# Patient Record
Sex: MALE | Race: WHITE | NOT HISPANIC OR LATINO | Employment: FULL TIME | URBAN - METROPOLITAN AREA
[De-identification: names, ages, dates, MRNs, and addresses within clinical notes are randomized per-mention and may not be internally consistent; named-entity substitution may affect disease eponyms.]

---

## 2017-10-27 ENCOUNTER — AMBULATORY - RIVER FALLS (OUTPATIENT)
Dept: FAMILY MEDICINE | Facility: CLINIC | Age: 37
End: 2017-10-27
Payer: COMMERCIAL

## 2018-01-09 ENCOUNTER — OFFICE VISIT - RIVER FALLS (OUTPATIENT)
Dept: FAMILY MEDICINE | Facility: CLINIC | Age: 38
End: 2018-01-09
Payer: COMMERCIAL

## 2018-01-09 ASSESSMENT — MIFFLIN-ST. JEOR: SCORE: 1960.31

## 2018-02-23 ENCOUNTER — OFFICE VISIT - RIVER FALLS (OUTPATIENT)
Dept: FAMILY MEDICINE | Facility: CLINIC | Age: 38
End: 2018-02-23
Payer: COMMERCIAL

## 2018-03-12 ENCOUNTER — AMBULATORY - RIVER FALLS (OUTPATIENT)
Dept: FAMILY MEDICINE | Facility: CLINIC | Age: 38
End: 2018-03-12
Payer: COMMERCIAL

## 2018-03-13 ENCOUNTER — OFFICE VISIT - RIVER FALLS (OUTPATIENT)
Dept: FAMILY MEDICINE | Facility: CLINIC | Age: 38
End: 2018-03-13
Payer: COMMERCIAL

## 2018-03-13 ASSESSMENT — MIFFLIN-ST. JEOR: SCORE: 1975.73

## 2018-03-29 ENCOUNTER — AMBULATORY - RIVER FALLS (OUTPATIENT)
Dept: FAMILY MEDICINE | Facility: CLINIC | Age: 38
End: 2018-03-29
Payer: COMMERCIAL

## 2018-04-09 ENCOUNTER — OFFICE VISIT - RIVER FALLS (OUTPATIENT)
Dept: FAMILY MEDICINE | Facility: CLINIC | Age: 38
End: 2018-04-09
Payer: COMMERCIAL

## 2018-04-09 ASSESSMENT — MIFFLIN-ST. JEOR: SCORE: 1953.05

## 2018-05-09 ENCOUNTER — AMBULATORY - RIVER FALLS (OUTPATIENT)
Dept: FAMILY MEDICINE | Facility: CLINIC | Age: 38
End: 2018-05-09
Payer: COMMERCIAL

## 2018-06-14 ENCOUNTER — OFFICE VISIT - RIVER FALLS (OUTPATIENT)
Dept: FAMILY MEDICINE | Facility: CLINIC | Age: 38
End: 2018-06-14
Payer: COMMERCIAL

## 2018-06-14 ASSESSMENT — MIFFLIN-ST. JEOR: SCORE: 1984.81

## 2018-10-02 ENCOUNTER — AMBULATORY - RIVER FALLS (OUTPATIENT)
Dept: FAMILY MEDICINE | Facility: CLINIC | Age: 38
End: 2018-10-02
Payer: COMMERCIAL

## 2018-10-10 ENCOUNTER — OFFICE VISIT - RIVER FALLS (OUTPATIENT)
Dept: FAMILY MEDICINE | Facility: CLINIC | Age: 38
End: 2018-10-10
Payer: COMMERCIAL

## 2018-10-10 ASSESSMENT — MIFFLIN-ST. JEOR: SCORE: 1979.36

## 2019-05-13 ENCOUNTER — COMMUNICATION - RIVER FALLS (OUTPATIENT)
Dept: FAMILY MEDICINE | Facility: CLINIC | Age: 39
End: 2019-05-13
Payer: COMMERCIAL

## 2019-05-13 ENCOUNTER — OFFICE VISIT - RIVER FALLS (OUTPATIENT)
Dept: FAMILY MEDICINE | Facility: CLINIC | Age: 39
End: 2019-05-13
Payer: COMMERCIAL

## 2019-05-13 ASSESSMENT — MIFFLIN-ST. JEOR: SCORE: 1984.81

## 2019-05-14 LAB
BASOPHILS # BLD MANUAL: 21 10*3/UL (ref 0–200)
BASOPHILS NFR BLD MANUAL: 0.3 %
EOSINOPHIL # BLD MANUAL: 28 10*3/UL (ref 15–500)
EOSINOPHIL NFR BLD MANUAL: 0.4 %
ERYTHROCYTE [DISTWIDTH] IN BLOOD BY AUTOMATED COUNT: 12.7 % (ref 11–15)
HCT VFR BLD AUTO: 46.9 % (ref 38.5–50)
HGB BLD-MCNC: 16.5 GM/DL (ref 13.2–17.1)
LYMPHOCYTES # BLD MANUAL: 2329 10*3/UL (ref 850–3900)
LYMPHOCYTES NFR BLD MANUAL: 32.8 %
MCH RBC QN AUTO: 30.4 PG (ref 27–33)
MCHC RBC AUTO-ENTMCNC: 35.2 GM/DL (ref 32–36)
MCV RBC AUTO: 86.5 FL (ref 80–100)
MONOCYTES # BLD MANUAL: 547 10*3/UL (ref 200–950)
MONOCYTES NFR BLD MANUAL: 7.7 %
NEUTROPHILS # BLD MANUAL: 4175 10*3/UL (ref 1500–7800)
NEUTROPHILS NFR BLD MANUAL: 58.8 %
PLATELET # BLD AUTO: 360 10*3/UL (ref 140–400)
PMV BLD: 10 FL (ref 7.5–12.5)
RBC # BLD AUTO: 5.42 10*6/UL (ref 4.2–5.8)
WBC # BLD AUTO: 7.1 10*3/UL (ref 3.8–10.8)

## 2019-11-04 ENCOUNTER — OFFICE VISIT - RIVER FALLS (OUTPATIENT)
Dept: FAMILY MEDICINE | Facility: CLINIC | Age: 39
End: 2019-11-04
Payer: COMMERCIAL

## 2019-11-04 ASSESSMENT — MIFFLIN-ST. JEOR: SCORE: 1997.51

## 2020-02-23 ENCOUNTER — OFFICE VISIT - RIVER FALLS (OUTPATIENT)
Dept: FAMILY MEDICINE | Facility: CLINIC | Age: 40
End: 2020-02-23
Payer: COMMERCIAL

## 2020-02-23 ASSESSMENT — MIFFLIN-ST. JEOR: SCORE: 1964.85

## 2020-02-27 ENCOUNTER — OFFICE VISIT - RIVER FALLS (OUTPATIENT)
Dept: FAMILY MEDICINE | Facility: CLINIC | Age: 40
End: 2020-02-27
Payer: COMMERCIAL

## 2020-02-27 ASSESSMENT — MIFFLIN-ST. JEOR: SCORE: 1965.76

## 2020-02-28 ENCOUNTER — COMMUNICATION - RIVER FALLS (OUTPATIENT)
Dept: FAMILY MEDICINE | Facility: CLINIC | Age: 40
End: 2020-02-28
Payer: COMMERCIAL

## 2020-02-28 LAB
A/G RATIO - HISTORICAL: 1.6 (ref 1–2.5)
ALBUMIN SERPL-MCNC: 4.3 GM/DL (ref 3.6–5.1)
ALP SERPL-CCNC: 76 UNIT/L (ref 36–130)
ALT SERPL W P-5'-P-CCNC: 21 UNIT/L (ref 9–46)
AST SERPL W P-5'-P-CCNC: 19 UNIT/L (ref 10–40)
BILIRUB DIRECT SERPL-MCNC: 0.1 MG/DL
BILIRUB INDIRECT SERPL-MCNC: 0.4 MG/DL (ref 0.2–1.2)
BILIRUB SERPL-MCNC: 0.5 MG/DL (ref 0.2–1.2)
BUN SERPL-MCNC: 13 MG/DL (ref 7–25)
BUN/CREAT RATIO - HISTORICAL: ABNORMAL (ref 6–22)
CALCIUM SERPL-MCNC: 9.5 MG/DL (ref 8.6–10.3)
CHLORIDE BLD-SCNC: 102 MMOL/L (ref 98–110)
CO2 SERPL-SCNC: 28 MMOL/L (ref 20–32)
CREAT SERPL-MCNC: 1 MG/DL (ref 0.6–1.35)
EGFRCR SERPLBLD CKD-EPI 2021: 95 ML/MIN/1.73M2
ERYTHROCYTE [DISTWIDTH] IN BLOOD BY AUTOMATED COUNT: 12.4 % (ref 11–15)
ERYTHROCYTE [SEDIMENTATION RATE] IN BLOOD BY WESTERGREN METHOD: 2 MM/H
GLOBULIN: 2.7 (ref 1.9–3.7)
GLUCOSE BLD-MCNC: 113 MG/DL (ref 65–99)
HCT VFR BLD AUTO: 46.1 % (ref 38.5–50)
HGB BLD-MCNC: 16.4 GM/DL (ref 13.2–17.1)
LDLC SERPL CALC-MCNC: 103 MG/DL
MCH RBC QN AUTO: 30.9 PG (ref 27–33)
MCHC RBC AUTO-ENTMCNC: 35.6 GM/DL (ref 32–36)
MCV RBC AUTO: 86.8 FL (ref 80–100)
PLATELET # BLD AUTO: 322 10*3/UL (ref 140–400)
PMV BLD: 10.5 FL (ref 7.5–12.5)
POTASSIUM BLD-SCNC: 4.2 MMOL/L (ref 3.5–5.3)
PROT SERPL-MCNC: 7 GM/DL (ref 6.1–8.1)
RBC # BLD AUTO: 5.31 10*6/UL (ref 4.2–5.8)
SODIUM SERPL-SCNC: 137 MMOL/L (ref 135–146)
TSH SERPL DL<=0.005 MIU/L-ACNC: 1.41 MIU/L (ref 0.4–4.5)
WBC # BLD AUTO: 6.6 10*3/UL (ref 3.8–10.8)

## 2020-05-27 ENCOUNTER — AMBULATORY - RIVER FALLS (OUTPATIENT)
Dept: FAMILY MEDICINE | Facility: CLINIC | Age: 40
End: 2020-05-27
Payer: COMMERCIAL

## 2020-05-28 ENCOUNTER — COMMUNICATION - RIVER FALLS (OUTPATIENT)
Dept: FAMILY MEDICINE | Facility: CLINIC | Age: 40
End: 2020-05-28
Payer: COMMERCIAL

## 2020-05-28 LAB — SARS-COV-2 AB PNL SERPL IA: NEGATIVE

## 2020-07-13 ENCOUNTER — AMBULATORY - RIVER FALLS (OUTPATIENT)
Dept: FAMILY MEDICINE | Facility: CLINIC | Age: 40
End: 2020-07-13
Payer: COMMERCIAL

## 2020-07-14 LAB
BASOPHILS # BLD MANUAL: 31 10*3/UL (ref 0–200)
BASOPHILS NFR BLD MANUAL: 0.5 %
EOSINOPHIL # BLD MANUAL: 50 10*3/UL (ref 15–500)
EOSINOPHIL NFR BLD MANUAL: 0.8 %
ERYTHROCYTE [DISTWIDTH] IN BLOOD BY AUTOMATED COUNT: 12.6 % (ref 11–15)
HCT VFR BLD AUTO: 46.4 % (ref 38.5–50)
HGB BLD-MCNC: 15.8 GM/DL (ref 13.2–17.1)
LYMPHOCYTES # BLD MANUAL: 2027 10*3/UL (ref 850–3900)
LYMPHOCYTES NFR BLD MANUAL: 32.7 %
MCH RBC QN AUTO: 29.9 PG (ref 27–33)
MCHC RBC AUTO-ENTMCNC: 34.1 GM/DL (ref 32–36)
MCV RBC AUTO: 87.9 FL (ref 80–100)
MONOCYTES # BLD MANUAL: 632 10*3/UL (ref 200–950)
MONOCYTES NFR BLD MANUAL: 10.2 %
NEUTROPHILS # BLD MANUAL: 3460 10*3/UL (ref 1500–7800)
NEUTROPHILS NFR BLD MANUAL: 55.8 %
PLATELET # BLD AUTO: 344 10*3/UL (ref 140–400)
PMV BLD: 10.5 FL (ref 7.5–12.5)
RBC # BLD AUTO: 5.28 10*6/UL (ref 4.2–5.8)
WBC # BLD AUTO: 6.2 10*3/UL (ref 3.8–10.8)

## 2021-01-19 ENCOUNTER — OFFICE VISIT - RIVER FALLS (OUTPATIENT)
Dept: FAMILY MEDICINE | Facility: CLINIC | Age: 41
End: 2021-01-19
Payer: COMMERCIAL

## 2021-01-19 ENCOUNTER — COMMUNICATION - RIVER FALLS (OUTPATIENT)
Dept: FAMILY MEDICINE | Facility: CLINIC | Age: 41
End: 2021-01-19
Payer: COMMERCIAL

## 2021-01-27 ENCOUNTER — OFFICE VISIT - RIVER FALLS (OUTPATIENT)
Dept: FAMILY MEDICINE | Facility: CLINIC | Age: 41
End: 2021-01-27
Payer: COMMERCIAL

## 2021-01-27 ASSESSMENT — MIFFLIN-ST. JEOR: SCORE: 1953.05

## 2021-04-15 ENCOUNTER — OFFICE VISIT - RIVER FALLS (OUTPATIENT)
Dept: FAMILY MEDICINE | Facility: CLINIC | Age: 41
End: 2021-04-15
Payer: COMMERCIAL

## 2021-04-15 ASSESSMENT — MIFFLIN-ST. JEOR: SCORE: 1965.76

## 2021-04-29 ENCOUNTER — COMMUNICATION - RIVER FALLS (OUTPATIENT)
Dept: FAMILY MEDICINE | Facility: CLINIC | Age: 41
End: 2021-04-29
Payer: COMMERCIAL

## 2021-05-27 ENCOUNTER — RECORDS - HEALTHEAST (OUTPATIENT)
Dept: ADMINISTRATIVE | Facility: CLINIC | Age: 41
End: 2021-05-27

## 2021-05-28 ENCOUNTER — RECORDS - HEALTHEAST (OUTPATIENT)
Dept: ADMINISTRATIVE | Facility: CLINIC | Age: 41
End: 2021-05-28

## 2021-09-14 ENCOUNTER — OFFICE VISIT - RIVER FALLS (OUTPATIENT)
Dept: FAMILY MEDICINE | Facility: CLINIC | Age: 41
End: 2021-09-14
Payer: COMMERCIAL

## 2021-09-14 ASSESSMENT — MIFFLIN-ST. JEOR: SCORE: 1970.74

## 2021-09-20 ENCOUNTER — COMMUNICATION - RIVER FALLS (OUTPATIENT)
Dept: FAMILY MEDICINE | Facility: CLINIC | Age: 41
End: 2021-09-20
Payer: COMMERCIAL

## 2021-09-24 ENCOUNTER — OFFICE VISIT - RIVER FALLS (OUTPATIENT)
Dept: FAMILY MEDICINE | Facility: CLINIC | Age: 41
End: 2021-09-24
Payer: COMMERCIAL

## 2021-09-24 ASSESSMENT — MIFFLIN-ST. JEOR: SCORE: 1970.74

## 2021-11-29 ENCOUNTER — OFFICE VISIT - RIVER FALLS (OUTPATIENT)
Dept: FAMILY MEDICINE | Facility: CLINIC | Age: 41
End: 2021-11-29
Payer: COMMERCIAL

## 2021-11-29 ASSESSMENT — MIFFLIN-ST. JEOR: SCORE: 2017.01

## 2021-12-01 ENCOUNTER — COMMUNICATION - RIVER FALLS (OUTPATIENT)
Dept: FAMILY MEDICINE | Facility: CLINIC | Age: 41
End: 2021-12-01
Payer: COMMERCIAL

## 2021-12-01 LAB
A/G RATIO - HISTORICAL: 1.5 (ref 1–2.5)
ALBUMIN SERPL-MCNC: 4.3 GM/DL (ref 3.6–5.1)
ALP SERPL-CCNC: 64 UNIT/L (ref 36–130)
ALT SERPL W P-5'-P-CCNC: 19 UNIT/L (ref 9–46)
AST SERPL W P-5'-P-CCNC: 17 UNIT/L (ref 10–40)
BILIRUB DIRECT SERPL-MCNC: 0.1 MG/DL
BILIRUB INDIRECT SERPL-MCNC: 0.4 MG/DL (ref 0.2–1.2)
BILIRUB SERPL-MCNC: 0.5 MG/DL (ref 0.2–1.2)
BUN SERPL-MCNC: 15 MG/DL (ref 7–25)
BUN/CREAT RATIO - HISTORICAL: NORMAL (ref 6–22)
CALCIUM SERPL-MCNC: 9.6 MG/DL (ref 8.6–10.3)
CHLORIDE BLD-SCNC: 102 MMOL/L (ref 98–110)
CO2 SERPL-SCNC: 25 MMOL/L (ref 20–32)
CREAT SERPL-MCNC: 1.26 MG/DL (ref 0.6–1.35)
EGFRCR SERPLBLD CKD-EPI 2021: 71 ML/MIN/1.73M2
ERYTHROCYTE [DISTWIDTH] IN BLOOD BY AUTOMATED COUNT: 12.7 % (ref 11–15)
GLOBULIN: 2.9 (ref 1.9–3.7)
GLUCOSE BLD-MCNC: 91 MG/DL (ref 65–99)
HCT VFR BLD AUTO: 44.7 % (ref 38.5–50)
HGB BLD-MCNC: 16.1 GM/DL (ref 13.2–17.1)
LDLC SERPL CALC-MCNC: 117 MG/DL
MCH RBC QN AUTO: 31 PG (ref 27–33)
MCHC RBC AUTO-ENTMCNC: 36 GM/DL (ref 32–36)
MCV RBC AUTO: 86.1 FL (ref 80–100)
PLATELET # BLD AUTO: 356 10*3/UL (ref 140–400)
PMV BLD: 10.2 FL (ref 7.5–12.5)
POTASSIUM BLD-SCNC: 4.2 MMOL/L (ref 3.5–5.3)
PROT SERPL-MCNC: 7.2 GM/DL (ref 6.1–8.1)
RBC # BLD AUTO: 5.19 10*6/UL (ref 4.2–5.8)
SODIUM SERPL-SCNC: 136 MMOL/L (ref 135–146)
WBC # BLD AUTO: 6.5 10*3/UL (ref 3.8–10.8)

## 2022-01-20 ENCOUNTER — COMMUNICATION - RIVER FALLS (OUTPATIENT)
Dept: FAMILY MEDICINE | Facility: CLINIC | Age: 42
End: 2022-01-20
Payer: COMMERCIAL

## 2022-02-11 VITALS
HEIGHT: 71 IN | WEIGHT: 226.8 LBS | HEART RATE: 85 BPM | HEART RATE: 62 BPM | BODY MASS INDEX: 31.75 KG/M2 | HEIGHT: 71 IN | BODY MASS INDEX: 31.78 KG/M2 | OXYGEN SATURATION: 97 % | TEMPERATURE: 97 F | SYSTOLIC BLOOD PRESSURE: 116 MMHG | DIASTOLIC BLOOD PRESSURE: 74 MMHG | SYSTOLIC BLOOD PRESSURE: 108 MMHG | WEIGHT: 227 LBS | DIASTOLIC BLOOD PRESSURE: 60 MMHG

## 2022-02-11 VITALS
HEART RATE: 72 BPM | HEIGHT: 71 IN | BODY MASS INDEX: 32.37 KG/M2 | WEIGHT: 231.2 LBS | TEMPERATURE: 98.6 F | DIASTOLIC BLOOD PRESSURE: 72 MMHG | SYSTOLIC BLOOD PRESSURE: 124 MMHG

## 2022-02-12 VITALS
WEIGHT: 229.2 LBS | HEART RATE: 64 BPM | DIASTOLIC BLOOD PRESSURE: 74 MMHG | BODY MASS INDEX: 31.39 KG/M2 | HEIGHT: 71 IN | BODY MASS INDEX: 32.09 KG/M2 | SYSTOLIC BLOOD PRESSURE: 122 MMHG | HEART RATE: 74 BPM | HEIGHT: 71 IN | DIASTOLIC BLOOD PRESSURE: 70 MMHG | SYSTOLIC BLOOD PRESSURE: 112 MMHG | WEIGHT: 224.2 LBS

## 2022-02-12 VITALS
HEART RATE: 109 BPM | HEIGHT: 71 IN | OXYGEN SATURATION: 95 % | WEIGHT: 230 LBS | BODY MASS INDEX: 32.2 KG/M2 | DIASTOLIC BLOOD PRESSURE: 88 MMHG | SYSTOLIC BLOOD PRESSURE: 116 MMHG

## 2022-02-12 VITALS
OXYGEN SATURATION: 97 % | OXYGEN SATURATION: 96 % | BODY MASS INDEX: 31.61 KG/M2 | WEIGHT: 223 LBS | HEART RATE: 103 BPM | TEMPERATURE: 97.9 F | SYSTOLIC BLOOD PRESSURE: 101 MMHG | HEART RATE: 98 BPM | SYSTOLIC BLOOD PRESSURE: 122 MMHG | DIASTOLIC BLOOD PRESSURE: 70 MMHG | HEIGHT: 71 IN | DIASTOLIC BLOOD PRESSURE: 80 MMHG | BODY MASS INDEX: 30.88 KG/M2 | TEMPERATURE: 97.1 F | WEIGHT: 225.8 LBS

## 2022-02-12 VITALS
SYSTOLIC BLOOD PRESSURE: 112 MMHG | OXYGEN SATURATION: 99 % | BODY MASS INDEX: 31.93 KG/M2 | HEART RATE: 79 BPM | HEIGHT: 71 IN | BODY MASS INDEX: 31.93 KG/M2 | WEIGHT: 228.1 LBS | WEIGHT: 228.1 LBS | DIASTOLIC BLOOD PRESSURE: 75 MMHG | HEART RATE: 82 BPM | SYSTOLIC BLOOD PRESSURE: 120 MMHG | HEIGHT: 71 IN | DIASTOLIC BLOOD PRESSURE: 72 MMHG

## 2022-02-12 VITALS
HEART RATE: 86 BPM | HEIGHT: 71 IN | DIASTOLIC BLOOD PRESSURE: 75 MMHG | OXYGEN SATURATION: 97 % | TEMPERATURE: 96.9 F | TEMPERATURE: 97.8 F | HEART RATE: 98 BPM | WEIGHT: 225.6 LBS | SYSTOLIC BLOOD PRESSURE: 102 MMHG | WEIGHT: 224.2 LBS | BODY MASS INDEX: 31.39 KG/M2 | BODY MASS INDEX: 31.24 KG/M2 | SYSTOLIC BLOOD PRESSURE: 106 MMHG | DIASTOLIC BLOOD PRESSURE: 80 MMHG

## 2022-02-12 VITALS
HEART RATE: 98 BPM | SYSTOLIC BLOOD PRESSURE: 124 MMHG | BODY MASS INDEX: 33.36 KG/M2 | RESPIRATION RATE: 16 BRPM | HEIGHT: 71 IN | WEIGHT: 238.3 LBS | OXYGEN SATURATION: 96 % | DIASTOLIC BLOOD PRESSURE: 80 MMHG

## 2022-02-12 VITALS
TEMPERATURE: 97.2 F | SYSTOLIC BLOOD PRESSURE: 110 MMHG | DIASTOLIC BLOOD PRESSURE: 70 MMHG | WEIGHT: 234 LBS | HEART RATE: 82 BPM | HEIGHT: 71 IN | BODY MASS INDEX: 32.76 KG/M2

## 2022-02-12 VITALS
DIASTOLIC BLOOD PRESSURE: 72 MMHG | SYSTOLIC BLOOD PRESSURE: 116 MMHG | HEIGHT: 71 IN | WEIGHT: 231.2 LBS | BODY MASS INDEX: 32.37 KG/M2 | HEART RATE: 64 BPM

## 2022-02-12 VITALS
WEIGHT: 227 LBS | HEART RATE: 72 BPM | DIASTOLIC BLOOD PRESSURE: 66 MMHG | SYSTOLIC BLOOD PRESSURE: 124 MMHG | BODY MASS INDEX: 31.78 KG/M2 | HEIGHT: 71 IN

## 2022-02-15 NOTE — LETTER
(Inserted Image. Unable to display)   October 21, 2021    ANURAG GARCIA  [address] [place] [address]            Dear ANURAG,      Thank you for selecting [facility] for your healthcare needs.    Our records indicate you are due for the following services:     Follow-up office visit / Medication Check.    (FYI   Regarding office visits: In some instances, a video visit or telephone visit may be offered as an option.)      To schedule an appointment or if you have further questions, please contact your clinic at [phone].      Powered by [employer]    Sincerely,    Jose Adams MD

## 2022-02-15 NOTE — LETTER
(Inserted Image. Unable to display)       June 17, 2019      ANURAG GARCIA  [address], [place] [id number]          Dear ANURAG,      Thank you for selecting [facility] for your healthcare needs.     Our records indicate you are due for the following services:     Sleep Apnea Follow up ~ It is recommended, and possibly required by your insurance plan, that you see one of our sleep physicians, Dr. Justice Carver or Dr. Shiraz Adams, on a yearly basis for your sleep apnea.  To determine whether you are benefiting from your PAP therapy, a download of your machine will be needed.  We may be able to obtain the download remotely; however, to ensure this information will be available for your visit, please bring your CPAP machine and SD card to your visit.    Appointments with our sleep physicians can be made by calling your primary clinic.    To schedule an appointment or if you have further questions, please contact your primary clinic:   [facility]       [phone]   [facility]       [phone]              [facility]     [phone]    Powered by blabfeed    Sincerely,    Healthcare Providers at [facility]

## 2022-02-15 NOTE — PROGRESS NOTES
Patient:   ANURAG GARCIA            MRN: [id number]            FIN: [id number]               Age:   37 years     Sex:  Male     :  1980   Associated Diagnoses:   Influenza A with pneumonia   Author:   Nael Chambers MD      Visit Information      Primary Care Provider (PCP):  Jose Adams MD    NPANA# [id number]      Chief Complaint   2018 8:35 AM CST    Cough started yesterday.  Works in school, exposed to     Upper Respiratory Symptoms      History of Present Illness             The patient presents with symptoms of an upper respiratory infection.  The symptoms of the upper respiratory infection are described as rhinorrhea, sore throat, nasal congestion, cough and no sputum production.  The severity of the symptoms associated to the upper respiratory infection is mild.  The symptoms of upper respiratory infection have lasted for 1 day(s).  Exacerbating factors consist of none.  Relieving factors consist of none.  Associated symptoms consist of none.        Review of Systems   Constitutional:  Fatigue.    Ear/Nose/Mouth/Throat:  Nasal congestion.    Respiratory:  Cough, No shortness of breath, No sputum production, No wheezing.    Integumentary:  No rash.             Health Status   Allergies:    Allergic Reactions (Selected)  No Known Medication Allergies   Medications:  (Selected)   Prescriptions  Prescribed  Azithromycin 5 Day Dose Pack 250 mg oral tablet: 2 tabs today then 1 a day for 4 days, PO, qAM, x 5 day(s), Instructions: as directed on package labeling, # 6 tab(s), 0 Refill(s), Type: Acute, Pharmacy: [id number], 2 tabs today then 1 a day for 4 days po qam,x5 day(s),Instr:as directed on...  Tamiflu 75 mg oral capsule: 1 cap(s) ( 75 mg ), PO, BID, # 10 cap(s), 0 Refill(s), Type: Maintenance, Pharmacy: [id number], 1 cap(s) po bid,x5 day(s)  Documented Medications  Documented  Enbrel: ( 25 mg ), Subcutaneous, 2x/wk, 0 Refill(s), Type: Maintenance   Problem list:    All  Problems  Ankylosing spondylitis / SNOMED CT 42913257 / Confirmed  Anxiety / SNOMED CT 66202110 / Confirmed  History of mononucleosis / SNOMED CT 6559789952 / Confirmed  History of parotitis / SNOMED CT 4464345642 / Confirmed  Mechanical Low Back Pain / ICD-9-.2 / Confirmed  Obesity / ICD-9-.00 / Probable  JELANI (obstructive sleep apnea) / SNOMED CT 386596614 / Confirmed  Umbilical hernia / SNOMED CT 4143635981 / Confirmed  Resolved: Inpatient stay / SNOMED CT 600205840      Histories   Past Medical History:    Active  History of mononucleosis (8721984513): Onset on 12/11/2016 at 36 years.  History of parotitis (3544871062): Onset on 12/11/2016 at 36 years.  JELANI (obstructive sleep apnea) (088860638): Onset on 8/7/2014 at 33 years.  Ankylosing spondylitis (45370660): Onset in the month of 10/2006 at 25 years  Mechanical Low Back Pain (724.2)  Resolved  Inpatient stay (257586345): Onset on 12/11/2016 at 36 years.  Resolved on 12/14/2016 at 36 years.  Comments:  12/20/2016 CST 6:42 AM CST - Mile Palomares  @[place] - Tonsillitis, mononucleosis, parotitis   Family History:    Hypercholesterolemia  Mother  Father     Procedure history:    Polysomnogram (SNOMED CT 455769941) on 8/7/2014 at 33 Years.  Comments:  9/3/2014 1:39 PM - Kamla Cortes CMA  AHI of 4.2/hr.  REM AHI of 13   Social History:        Alcohol Assessment            Current, Once a week, 2 drinks/episode average.      Tobacco Assessment: Denies Tobacco Use            Never      Employment and Education Assessment            Employed, Work/School description: Phy.Ed./Health Teacher.      Home and Environment Assessment            Marital status:  (Living together).      Exercise and Physical Activity Assessment            Exercise frequency: 2-3 times/week.  ,        Alcohol Assessment            Current, Once a week, 2 drinks/episode average.      Tobacco Assessment: Denies Tobacco Use            Never      Employment and Education  Assessment            Employed, Work/School description: Phy.Ed./Health Teacher.      Home and Environment Assessment            Marital status:  (Living together).      Exercise and Physical Activity Assessment            Exercise frequency: 2-3 times/week.        Physical Examination   Vital Signs   2/23/2018 8:35 AM CST Temperature Tympanic 97.1 DegF  LOW    Peripheral Pulse Rate 103 bpm  HI    HR Method Electronic    Systolic Blood Pressure 101 mmHg    Diastolic Blood Pressure 70 mmHg    Mean Arterial Pressure 80 mmHg    BP Method Electronic    Oxygen Saturation 97 %      Measurements from flowsheet : Measurements   2/23/2018 8:35 AM CST    Weight Measured - Standard                223.0 lb     General:  Alert and oriented, No acute distress.    Eye:  Normal conjunctiva.    HENT:  Normocephalic, Tympanic membranes are clear, Oral mucosa is moist.    Neck:  Supple, Non-tender, No lymphadenopathy.    Respiratory:  Breath sounds are equal, Symmetrical chest wall expansion.         Respirations: Are within normal limits.         Pattern: Regular.         Breath sounds: Left, Middle lobe, Crackles present.    Cardiovascular:  Normal rate, Regular rhythm, No murmur, Good pulses equal in all extremities, Normal peripheral perfusion, No edema.    Gastrointestinal:  Soft, Non-tender.    Integumentary:  Warm, No rash.    Neurologic:  Alert, Oriented.    Psychiatric:  Cooperative, Appropriate mood & affect.       Review / Management   Course:  Progressing as expected.       Impression and Plan   Diagnosis     Influenza A with pneumonia (AVV23-KW J09.X1).     Course:  Not progressing as expected.    Plan:  see meds  fu 1 week if not better sooner if worse.    Patient Instructions:       Counseled: Patient, Regarding diagnosis, Regarding treatment, Regarding medications.    Orders     Return to clinic or ER if no improvements or worsening signs symptoms.     Symptomatic care guidelines reviewed.     Counseled:   Regarding diagnosis (Reviewed the viral nature of this illness and recommended rest and fluids. Discussed the natural history of viral URI, anticipatory guidance).    Patient Instructions:  Launch follow-up (if licensed).

## 2022-02-15 NOTE — NURSING NOTE
Comprehensive Intake Entered On:  9/14/2021 8:40 AM CDT    Performed On:  9/14/2021 8:37 AM CDT by Neyda Worley CMA               Summary   Chief Complaint :   Spot on right thigh, growing and it hurts x few months   Weight Measured :   228.1 lb(Converted to: 228 lb 2 oz, 103.464 kg)    Height Measured :   71.25 in(Converted to: 5 ft 11 in, 180.97 cm)    Body Mass Index :   31.59 kg/m2 (HI)    Body Surface Area :   2.28 m2   Systolic Blood Pressure :   112 mmHg   Diastolic Blood Pressure :   75 mmHg   Mean Arterial Pressure :   87 mmHg   Peripheral Pulse Rate :   82 bpm   BP Site :   Right arm   BP Method :   Electronic   HR Method :   Electronic   Neyda Worley CMA - 9/14/2021 8:37 AM CDT   Health Status   Allergies Verified? :   Yes   Medication History Verified? :   Yes   Medical History Verified? :   Yes   Tobacco Use? :   Never smoker   Neyda Worley CMA - 9/14/2021 8:37 AM CDT   Consents   Consent for Immunization Exchange :   Consent Granted   Consent for Immunizations to Providers :   Consent Granted   Neyda Worley CMA - 9/14/2021 8:37 AM CDT   Meds / Allergies   (As Of: 9/14/2021 8:40:55 AM CDT)   Allergies (Active)   No Known Medication Allergies  Estimated Onset Date:   Unspecified ; Created By:   Carson Kramer CMA; Reaction Status:   Active ; Category:   Drug ; Substance:   No Known Medication Allergies ; Type:   Allergy ; Updated By:   Carson Kramer CMA; Reviewed Date:   9/14/2021 8:39 AM CDT        Medication List   (As Of: 9/14/2021 8:40:55 AM CDT)   Prescription/Discharge Order    Miscellaneous Rx Supply  :   Miscellaneous Rx Supply ; Status:   Prescribed ; Ordered As Mnemonic:   Auto-Titrating CPAP 4-16 ; Simple Display Line:   See Instructions, Heated humidifier, heated tubing, filters, nasal or full face mask of choice with headgear.  Replacement cushions and supplies as needed.  Months = 99 (Lifetime), 1 EA, 0 Refill(s) ; Ordering Provider:   Jose Adams MD; Catalog Code:    Miscellaneous Rx Supply ; Order Dt/Tm:   3/13/2018 4:50:29 PM CDT          buPROPion  :   buPROPion ; Status:   Prescribed ; Ordered As Mnemonic:   BuPROPion (Eqv-Wellbutrin SR) 150 mg/12 hours oral tablet, extended release ; Simple Display Line:   1 tab(s), Oral, bid, for 90 day(s), 180 tab(s), 0 Refill(s) ; Ordering Provider:   Jose Adams MD; Catalog Code:   buPROPion ; Order Dt/Tm:   8/27/2021 2:42:01 PM CDT          rivaroxaban  :   rivaroxaban ; Status:   Prescribed ; Ordered As Mnemonic:   Xarelto 20 mg oral tablet ; Simple Display Line:   20 mg, 1 tab(s), Oral, qpm, for 90 day(s), 90 tab(s), 1 Refill(s) ; Ordering Provider:   Jose Adams MD; Catalog Code:   rivaroxaban ; Order Dt/Tm:   4/15/2021 4:40:49 PM CDT            Home Meds    etanercept  :   etanercept ; Status:   Documented ; Ordered As Mnemonic:   Enbrel ; Simple Display Line:   25 mg, Subcutaneous, 2x/wk, 0 Refill(s) ; Catalog Code:   etanercept ; Order Dt/Tm:   5/11/2010 5:42:16 PM CDT

## 2022-02-15 NOTE — TELEPHONE ENCOUNTER
---------------------  From: Dora Saldana LPN (Phone Messages Pool ([id number]))   To: [facility] Message Pool ([id number]);     Sent: 1/27/2021 1:45:29 PM CST  Subject: Xarelto     Phone Message    PCP:   JEAN MARIE      Time of Call:  1:01pm       Person Calling:  pt  Phone number:  [phone]    Returned call at: _    Note:   Pt LM stating he talked to his insurance and they will cover both Eliquis and Xarelto with same copay. Pt says he would like to go with Xarelto because he thinks JEAN MARIE told him he would just have to take it once a day.    Please advise.    Last office visit and reason:  1/27/21 Factor 5---------------------  From: Ivana Marquez CMA ([facility] Message Pool ([id number]))   To: Jose Adams MD;     Sent: 1/27/2021 1:46:50 PM CST  Subject: FW: Xarelto---------------------  From: Jose Adams MD   To: [facility] Message Pool ([id number]);     Sent: 1/27/2021 2:09:26 PM CST  Subject: RE: Xarelto     i would finish the month of eliquis then ok to transition to xarelto 20mg po q day.   Xarelto is bid for the first 21 days so no advantage till after the month---------------------  From: Ivana Marquez CMA ([facility] Message Pool ([id number]))   To: Jose Adams MD;     Sent: 1/27/2021 2:19:47 PM CST  Subject: RE: Xarelto     Once he finishes the eliquis would he need to still do the xarelto bid for the first 21 days? or can he just go to the once a day since he's already taken the eliquis?---------------------  From: Jose Adams MD   To: [facility] Message Pool ([id number]_[place] [facility] [place]);     Sent: 1/28/2021 9:29:44 AM CST  Subject: RE: Xarelto     finishes eliquis (at least 21 days) then xarelto 1 time per dayLMTCB at 11:28am

## 2022-02-15 NOTE — TELEPHONE ENCOUNTER
---------------------  From: Brittaney Vera (eRx Pool ([id number]_[facility]))   To: JEAN MARIE Message Pool ([id number]_[place] [facility] [place]);     Sent: 6/29/2021 4:01:36 PM CDT  Subject: FW: Medication Management   Due Date/Time: 6/29/2021 9:21:00 PM CDT       PCP:  JEAN MARIE    Medication:  BuPropion  Last Filled:  5/3/2021    Quantity: 60  Refills:  1    Date of last office visit and reason:  4/15/2021, anxiety  Date of last labs pertaining to condition:      Note:  _      Return to Clinic order placed? no        ------------------------------------------  From: [facility] #[id number]  To: Jose Adams MD  Sent: June 28, 2021 9:21:12 PM CDT  Subject: Medication Management  Due: June 4, 2021 8:25:53 PM CDT     ** On Hold Pending Signature **     Dispensed Drug: buPROPion (BuPROPion (Eqv-Wellbutrin SR) 150 mg/12 hours oral tablet, extended release), TAKE 1 TABLET BY MOUTH TWICE DAILY  Quantity: 60 tab(s)  Days Supply: 30  Refills: 0  Substitutions Allowed  Notes from Pharmacy:  ---------------------------------------------------------------  From: Ivana Marquez CMA ([facility] Message Pool ([id number]_[facility]))   To: Jose Adams MD;     Sent: 6/30/2021 7:07:39 AM CDT  Subject: FW: Medication Management   Due Date/Time: 6/29/2021 9:21:00 PM CDT     When did you want to see him next?---------------------  From: Jose Adams MD   To: [facility] #[id number]    Sent: 6/30/2021 8:31:02 AM CDT  Subject: FW: Medication Management     ** Submitted: **  Complete:buPROPion (Wellbutrin  mg/12 hours oral tablet, extended release)   Signed by Jose Adams MD  6/30/2021 1:31:00 PM [facility]    ** Approved **  buPROPion (BUPROPION SR 150MG TABLETS (12 H))  TAKE 1 TABLET BY MOUTH TWICE DAILY  Qty:  60 tab(s)        Days Supply:  30        Refills:  0          Substitutions Allowed     Route To Pharmacy - [facility] #[id number]

## 2022-02-15 NOTE — LETTER
(Inserted Image. Unable to display)   January 27, 2021      ANURAG GARCIA  [address], [place] [id number]        Dear ANURAG,      Thank you for selecting [facility] for your healthcare needs.       I did receive and review the [facility] labs.  I received them shortly after you were seen.  There are 32 pages of faxs so I may have missed a lab but I did not see a Leiden Factor 5 ordered.   The labs they did do are normal.          Please contact me or my assistant at [phone] you have any questions or concerns.     Sincerely,        Jose Adams MD

## 2022-02-15 NOTE — NURSING NOTE
Comprehensive Intake Entered On:  2/27/2020 1:01 PM CST    Performed On:  2/27/2020 12:58 PM CST by Ivana Marquez CMA   Chief Complaint :   f/u blood clot. Not feeling great   Weight Measured :   227.0 lb(Converted to: 227 lb 0 oz, 102.97 kg)    Height Measured :   71.25 in(Converted to: 5 ft 11 in, 180.97 cm)    Body Mass Index :   31.43 kg/m2 (HI)    Body Surface Area :   2.27 m2   Systolic Blood Pressure :   116 mmHg   Diastolic Blood Pressure :   60 mmHg   Mean Arterial Pressure :   79 mmHg   Peripheral Pulse Rate :   62 bpm   Ivana Marquez CMA - 2/27/2020 12:58 PM CST   Health Status   Allergies Verified? :   Yes   Medication History Verified? :   Yes   Pre-Visit Planning Status :   Completed   Tobacco Use? :   Never smoker   Ivana Marquez CMA - 2/27/2020 12:58 PM CST   Consents   Consent for Immunization Exchange :   Consent Granted   Consent for Immunizations to Providers :   Consent Granted   Ivana Marquez CMA - 2/27/2020 12:58 PM CST   Meds / Allergies   (As Of: 2/27/2020 1:01:56 PM CST)   Allergies (Active)   No Known Medication Allergies  Estimated Onset Date:   Unspecified ; Created By:   Carson Kramer CMA; Reaction Status:   Active ; Category:   Drug ; Substance:   No Known Medication Allergies ; Type:   Allergy ; Updated By:   Carson Kramer CMA; Reviewed Date:   11/4/2019 3:06 PM CST        Medication List   (As Of: 2/27/2020 1:01:56 PM CST)   Prescription/Discharge Order    apixaban  :   apixaban ; Status:   Prescribed ; Ordered As Mnemonic:   Eliquis 5 mg oral tablet ; Simple Display Line:   See Instructions, 2 po bid x 7 days, 1 po bid thereafter, 60 tab(s), 2 Refill(s) ; Ordering Provider:   Lori De Guzman PA-C; Catalog Code:   apixaban ; Order Dt/Tm:   2/23/2020 5:16:16 PM CST          Miscellaneous Rx Supply  :   Miscellaneous Rx Supply ; Status:   Prescribed ; Ordered As Mnemonic:   Auto-Titrating CPAP 4-16 ; Simple Display Line:    See Instructions, Heated humidifier, heated tubing, filters, nasal or full face mask of choice with headgear.  Replacement cushions and supplies as needed.  Months = 99 (Lifetime), 1 EA, 0 Refill(s) ; Ordering Provider:   Jose Adams MD; Catalog Code:   Miscellaneous Rx Supply ; Order Dt/Tm:   3/13/2018 4:50:29 PM CDT            Home Meds    etanercept  :   etanercept ; Status:   Documented ; Ordered As Mnemonic:   Enbrel ; Simple Display Line:   25 mg, Subcutaneous, 2x/wk, 0 Refill(s) ; Catalog Code:   etanercept ; Order Dt/Tm:   5/11/2010 5:42:16 PM CDT

## 2022-02-15 NOTE — PROGRESS NOTES
Chief Complaint    Pt here for med check. He is also c/o R shoulder making crackling sounds and weakness. He was putting shelves up this past weekend.  History of Present Illness       Patient is here to discuss his health       Anxiety       He is doing much better.  He is enjoying his job he feels like his marriage is more stable.  He is undergoing counseling for couples.  He thinks he is 90% back to normal he has not had problems with his present medication       Obstructive sleep apnea patient does not use CPAP but feels like his sleep is not an issue for him at present       Ankylosing spondylitis       He sees a rheumatologist and is due a visit soon he has been on Enbrel and thinks it cut allows him to have no pain but he knows if he misses a dose of medicine he can feel pain starting       Right shoulder pain he hears some crackling and if he tries to hold it up he loses strength he has not had numbness or tingling and there is been no real trauma  Review of Systems       See HPI.  All other review of systems negative.  Physical Exam   Vitals & Measurements    HR: 98 (Peripheral)  RR: 16  BP: 124/80  SpO2: 96%     HT: 71.25 in  WT: 238.3 lb  BMI: 33        Alert and oriented       Lungs are clear       Heart regular rate       Gait is normal       He has good external rotation internal rotation of the right arm there is obvious crepitus as he does abduction beyond about 60 degrees  Assessment/Plan       1. Ankylosing spondylitis (M45.7)          He is having good success we talked about the need for regular stretching and exercise         Ordered:          Basic Metabolic Panel* ([facility]), Specimen Type: Serum, Collection Date: 11/29/21 16:15:00 CST          CBC (h/h, RBC, indices, WBC, Plt)* ([facility]), Specimen Type: Blood, Collection Date: 11/29/21 16:15:00 CST          Direct LDL* ([facility]), Specimen Type: Serum, Collection Date: 11/29/21 16:15:00 CST          Hepatic Function Panel* ([facility]), Specimen Type:  Serum, Collection Date: 11/29/21 16:15:00 CST                2. Anxiety (F41.1)          He might benefit from individual counseling         We will continue on his Wellbutrin we talked about the balance between excepting some symptoms is always present but not settling for less than it could be and he will think about that and he would be a candidate for adding further medication         Ordered:          Basic Metabolic Panel* ([facility]), Specimen Type: Serum, Collection Date: 11/29/21 16:15:00 CST          CBC (h/h, RBC, indices, WBC, Plt)* ([facility]), Specimen Type: Blood, Collection Date: 11/29/21 16:15:00 CST          Direct LDL* ([facility]), Specimen Type: Serum, Collection Date: 11/29/21 16:15:00 CST          Hepatic Function Panel* ([facility]), Specimen Type: Serum, Collection Date: 11/29/21 16:15:00 CST                3. JELANI (obstructive sleep apnea) (G47.33)          I am glad he is doing well but we talked about how it could increase as he gets older or if he gains weight         Ordered:          Basic Metabolic Panel* ([facility]), Specimen Type: Serum, Collection Date: 11/29/21 16:15:00 CST          CBC (h/h, RBC, indices, WBC, Plt)* ([facility]), Specimen Type: Blood, Collection Date: 11/29/21 16:15:00 CST          Direct LDL* ([facility]), Specimen Type: Serum, Collection Date: 11/29/21 16:15:00 CST          Hepatic Function Panel* ([facility]), Specimen Type: Serum, Collection Date: 11/29/21 16:15:00 CST                Orders:         buPROPion, = 1 tab(s), Oral, bid, # 180 tab(s), 0 Refill(s), Type: Maintenance, Pharmacy: [facility] #[id number], 1 tab(s) Oral bid,x90 day(s), 71.25, in, 11/29/21 15:45:00 CST, Height Measured, 238.3, lb, 11/29/21 15:45:00 CST, Weight Measured, (Ordered)         buPROPion, = 1 tab(s), Oral, bid, x 90 day(s), # 180 tab(s), 0 Refill(s), Type: Hard Stop, Pharmacy: [facility] #[id number], 71.25, in, 04/15/21 15:54:00 CDT, Height Measured, 227, lb, 04/15/21 15:54:00 CDT, Weight Measured,  (Completed)         rivaroxaban, = 1 tab(s), Oral, qpm, # 90 tab(s), 2 Refill(s), Pharmacy: [facility] #[id number], 1 tab(s) Oral qpm, 71.25, in, 11/29/21 15:45:00 CST, Height Measured, 238.3, lb, 11/29/21 15:45:00 CST, Weight Measured, (Ordered)         rivaroxaban, = 1 tab(s), Oral, qpm, # 90 tab(s), 0 Refill(s), Type: Hard Stop, Pharmacy: [email or web address] #[id number], 71.25, in, 09/24/21 14:40:00 CDT, Height Measured, 228.1, lb, 09/24/21 14:40:00 CDT, Weight Measured, (Completed)  Patient Information     Name:ANURAG GARCIA      Address:      [address] [place] [id number]     Sex:Male     YOB: 1980     [phone]     Emergency Contact:VIJI GARCIA     MRN:[id number]     FIN:[id number]     Location:[facility]     Date of Service:11/29/2021      Primary Care Physician:       Jose Adams MD, [phone]      Attending Physician:       Jose Adams MD, [phone]  Problem List/Past Medical History    Ongoing     Ankylosing spondylitis     Anxiety     History of mononucleosis     History of parotitis     Mechanical Low Back Pain     Obesity     JELANI (obstructive sleep apnea)     Umbilical hernia    Historical     Inpatient stay       Comments: @[facility] - Tonsillitis, mononucleosis, parotitis  Procedure/Surgical History     Repair of ventral hernia (04/19/2018)     Polysomnogram (08/07/2014)      Comments: AHI of 4.2/hr.  REM AHI of 13.  Medications    Auto-Titrating CPAP 4-16, See Instructions    BuPROPion (Eqv-Wellbutrin SR) 150 mg/12 hours oral tablet, extended release, 1 tab(s), Oral, bid    Enbrel, 25 mg, Subcutaneous, 2x/wk    Xarelto 20 mg oral tablet, 1 tab(s), Oral, qpm, 2 refills  Allergies    No Known Medication Allergies  Social History    Smoking Status     Never smoker     Alcohol      Current, Once a week, 2 drinks/episode average.     Electronic Cigarette/Vaping      Electronic Cigarette Use: Never.     Employment/School      Employed,  Work/School description: Phy.Ed./Health Teacher.     Exercise      Exercise frequency: 2-3 times/week.     Home/Environment      Marital status:  (Living together).     Tobacco      Never (less than 100 in lifetime)  Family History    Hypercholesterolemia: Mother and Father.  Immunizations          Scheduled Immunizations          Dose Date(s)          influenza          11/18/2016          influenza virus vaccine, inactivated          09/19/2012, 11/24/2020, 09/11/2014, 12/12/2016, 10/18/2019          SARS-CoV-2 (COVID-19) Pfizer-162b2          01/13/2021, 02/03/2021          Other Immunizations          influenza virus vaccine, inactivated          10/10/2013, 12/02/2015, 10/27/2017, 10/02/2018, 10/01/2019          tetanus/diphth/pertuss (Tdap) adult/adol          04/16/2008, 10/10/2018          pneumococcal (PCV13)          10/10/2018

## 2022-02-15 NOTE — TELEPHONE ENCOUNTER
Entered by Lori De Guzman PA-C on June 04, 2020 2:06:27 PM CDT  ---------------------  From: Lori De Guzman PA-C   To: [facility] [id number] [facility]    Sent: 6/4/2020 2:06:26 PM CDT  Subject: Medication Management     ** Submitted: **  Order:apixaban (Eliquis 5 mg oral tablet)  1 tab(s)  Oral  bid  Qty:  60 tab(s)        Refills:  2          Substitutions Allowed     Route To Pharmacy - [place] [id number] [facility]    Signed by Lori De Guzman PA-C  6/4/2020 7:05:00 PM    ** Submitted: **  Complete:apixaban (Eliquis 5 mg oral tablet)   Signed by Lori De Guzman PA-C  6/4/2020 7:06:00 PM    ** Not Approved:  **  apixaban (ELIQUIS 5 MG TABLET)  TAKE 2 TABLETS BY MOUTH TWICE A DAY X 7 DAYS, THEN 1 TWICE A DAY THEREAFTER  Qty:  60 tab(s)        Refills:  2          Substitutions Allowed     Details:  60 tab(s), TAKE 2 TABLETS BY MOUTH TWICE A DAY X 7 DAYS, THEN 1 TWICE A DAY THEREAFTER, Route to Pharmacy Electronically [facility] [place] [address] [facility], 2/23/2020, 5/14/2020, 30, Lori De Guzman PA-C      Route To Pharmacy - [place] [address] [facility]               ------------------------------------------  From: [facility] [id number] [facility]  To: Lori De Guzman PA-C  Sent: June 4, 2020 12:30:50 AM CDT  Subject: Medication Management  Due: May 30, 2020 3:27:43 PM CDT     ** On Hold Pending Signature **     Dispensed Drug: apixaban (Eliquis 5 mg oral tablet), TAKE 2 TABLETS BY MOUTH TWICE A DAY X 7 DAYS, THEN 1 TWICE A DAY THEREAFTER  Quantity: 60 tab(s)  Days Supply: 30  Refills: 2  Substitutions Allowed  Notes from Pharmacy:  ------------------------------------------

## 2022-02-15 NOTE — PROGRESS NOTES
Chief Complaint    f/u labs.  History of Present Illness       Patient here to discuss being positive for factor V Leiden.  He was heterozygous.       Patient does have underlying ankylosing spondylitis has been seen at the [facility].  In February patient was noted to have an unprovoked DVT and needed treated for 3 months of anticoagulation.  He had stopped it he did have testing at [facility] but said he had no evidence for any underlying problems.  However in January 2021 he has had more leg pain and was found to have an unprovoked DVT.  Because of the second episode labs were drawn and they have returned with his heterozygous Leiden.  I do not have a copy of the labs [facility] did but they have been requested but patient is quite sure he was told they were negative.  He has been started on Eliquis he is not having any shortness of breath no coughing no chest pains or pressure  Review of Systems       See HPI.  All other review of systems negative.  Physical Exam   Vitals & Measurements    T: 96.9  F (Tympanic)  HR: 86 (Peripheral)  BP: 102/80  SpO2: 97%     HT: 71.25 in  WT: 224.2 lb  BMI: 31.05        Alert and oriented       Normal respirations       No peripheral edema  Assessment/Plan       1. Ankylosing spondylitis (M45.7)          He has been stable and he is taking Enbrel no other present medications       2. Factor V Leiden (D68.51)          Even though is heterozygous he has had 2 unprovoked DVTs.  Have recommended he take Eliquis lifelong.  I am unsure what testing at [facility] showed but he certainly could go down for a consultation       3. History of DVT of lower extremity (Z86.718)          Presently doing well no signs of spread of his DVT  Patient Information     Name:ANURAG GARCIA ELISA      Address:      [address] [place] [phone]     Sex:Male     YOB: 1980     [phone]     Emergency Contact:VIJI GARCIA     MRN:[id number]     FIN:[id number]     Location:[facility]     Date of Service:01/27/2021      Primary Care Physician:       Jose Adams MD, [phone]      Attending Physician:       Jose Adams MD, [phone]  Problem List/Past Medical History    Ongoing     Ankylosing spondylitis     Anxiety     History of mononucleosis     History of parotitis     Mechanical Low Back Pain     Obesity     JELANI (obstructive sleep apnea)     Umbilical hernia    Historical     Inpatient stay       Comments: @[facility] - Tonsillitis, mononucleosis, parotitis  Procedure/Surgical History     Repair of ventral hernia (04/19/2018)     Polysomnogram (08/07/2014)      Comments: AHI of 4.2/hr.  REM AHI of 13.  Medications    Auto-Titrating CPAP 4-16, See Instructions    Eliquis 5 mg oral tablet, 5 mg, Oral, bid, 3 refills    Enbrel, 25 mg, Subcutaneous, 2x/wk  Allergies    No Known Medication Allergies  Social History    Smoking Status     Never smoker     Alcohol      Current, Once a week, 2 drinks/episode average.     Electronic Cigarette/Vaping      Electronic Cigarette Use: Never.     Employment/School      Employed, Work/School description: Phy.Ed./Health Teacher.     Exercise      Exercise frequency: 2-3 times/week.     Home/Environment      Marital status:  (Living together).     Tobacco      Never (less than 100 in lifetime)  Family History    Hypercholesterolemia: Mother and Father.  Immunizations      Vaccine Date Status          SARS-CoV-2 (COVID-19) Pfizer-162b2 01/13/2021 Recorded          influenza virus vaccine, inactivated 11/24/2020 Recorded          influenza virus vaccine, inactivated 10/01/2019 Recorded          tetanus/diphth/pertuss (Tdap) adult/adol 10/10/2018 Given          pneumococcal (PCV13) 10/10/2018 Given          influenza virus vaccine, inactivated 10/02/2018 Given          influenza virus vaccine, inactivated 10/27/2017 Given          influenza 11/18/2016 Recorded          influenza virus vaccine, inactivated 12/02/2015  Given          influenza virus vaccine, inactivated 10/10/2013 Given          influenza virus vaccine, inactivated 09/19/2012 Given          tetanus/diphth/pertuss (Tdap) adult/adol 04/16/2008 Recorded  Lab Results          Lab Results (Last 4 results within 90 days)           Sodium Level: 138 mmol/L [135 mmol/L - 146 mmol/L] (01/19/21 16:25:00)          Potassium Level: 4.4 mmol/L [3.5 mmol/L - 5.3 mmol/L] (01/19/21 16:25:00)          Chloride Level: 105 mmol/L [98 mmol/L - 110 mmol/L] (01/19/21 16:25:00)          CO2 Level: 24 mmol/L [20 mmol/L - 32 mmol/L] (01/19/21 16:25:00)          Glucose Level: 90 mg/dL [65 mg/dL - 99 mg/dL] (01/19/21 16:25:00)          BUN: 19 mg/dL [7 mg/dL - 25 mg/dL] (01/19/21 16:25:00)          Creatinine Level: 1.08 mg/dL [0.6 mg/dL - 1.35 mg/dL] (01/19/21 16:25:00)          BUN/Creat Ratio: NOT APPLICABLE [6  - 22] (01/19/21 16:25:00)          eGFR: 86 mL/min/1.73m2 (01/19/21 16:25:00)          eGFR African American: 100 mL/min/1.73m2 (01/19/21 16:25:00)          Calcium Level: 9.7 mg/dL [8.6 mg/dL - 10.3 mg/dL] (01/19/21 16:25:00)          Bilirubin Total: 0.5 mg/dL [0.2 mg/dL - 1.2 mg/dL] (01/19/21 16:25:00)          Alkaline Phosphatase: 87 unit/L [36 unit/L - 130 unit/L] (01/19/21 16:25:00)          AST/SGOT: 15 unit/L [10 unit/L - 40 unit/L] (01/19/21 16:25:00)          ALT/SGPT: 23 unit/L [9 unit/L - 46 unit/L] (01/19/21 16:25:00)          Protein Total: 7.6 gm/dL [6.1 gm/dL - 8.1 gm/dL] (01/19/21 16:25:00)          Albumin Level: 4.4 gm/dL [3.6 gm/dL - 5.1 gm/dL] (01/19/21 16:25:00)          Globulin: 3.2 [1.9  - 3.7] (01/19/21 16:25:00)          A/G Ratio: 1.4 [1  - 2.5] (01/19/21 16:25:00)          C-Reactive Protein (CRP): 16.9 mg/L High (01/19/21 16:25:00)          WBC: 7.5 [3.8  - 10.8] (01/19/21 16:25:00)          RBC: 5.26 [4.2  - 5.8] (01/19/21 16:25:00)          Hgb: 16.1 gm/dL [13.2 gm/dL - 17.1 gm/dL] (01/19/21 16:25:00)          Hct: 46.1 % [38.5 % - 50 %]  (01/19/21 16:25:00)          MCV: 87.6 fL [80 fL - 100 fL] (01/19/21 16:25:00)          MCH: 30.6 pg [27 pg - 33 pg] (01/19/21 16:25:00)          MCHC: 34.9 gm/dL [32 gm/dL - 36 gm/dL] (01/19/21 16:25:00)          RDW: 12.3 % [11 % - 15 %] (01/19/21 16:25:00)          Platelet: 307 [140  - 400] (01/19/21 16:25:00)          MPV: 10.2 fL [7.5 fL - 12.5 fL] (01/19/21 16:25:00)          Lymphocytes: 26.2 % (01/19/21 16:25:00)          Abs Lymphocytes: 1965 [850  - 3900] (01/19/21 16:25:00)          Neutrophils: 63 % (01/19/21 16:25:00)          Abs Neutrophils: 4725 [1500  - 7800] (01/19/21 16:25:00)          Monocytes: 8.9 % (01/19/21 16:25:00)          Abs Monocytes: 668 [200  - 950] (01/19/21 16:25:00)          Eosinophils: 1.5 % (01/19/21 16:25:00)          Abs Eosinophils: 113 [15  - 500] (01/19/21 16:25:00)          Basophils: 0.4 % (01/19/21 16:25:00)          Abs Basophils: 30 [0  - 200] (01/19/21 16:25:00)          Sed Rate: 2 (01/19/21 16:25:00)          PT: 11.2 [9  - 11.5] (01/19/21 16:25:00)          INR: 1.1 (01/19/21 16:25:00)          PTT: 29 [23  - 32] (01/19/21 16:25:00)          UA Color: YELLOW (01/19/21 16:25:00)          UA Appear: TURBID Abnormal (01/19/21 16:25:00)          UA pH: 5.5 [5  - 8] (01/19/21 16:25:00)          UA Specific Gravity: 1.03 [1.001  - 1.035] (01/19/21 16:25:00)          UA Glucose: NEGATIVE [NEGATIVE  - NEGATIVE] (01/19/21 16:25:00)          UA Bilirubin: NEGATIVE [NEGATIVE  - NEGATIVE] (01/19/21 16:25:00)          UA Ketones: NEGATIVE [NEGATIVE  - NEGATIVE] (01/19/21 16:25:00)          UA Blood: NEGATIVE [NEGATIVE  - NEGATIVE] (01/19/21 16:25:00)          UA Protein: NEGATIVE [NEGATIVE  - NEGATIVE] (01/19/21 16:25:00)          UA Nitrite: NEGATIVE [NEGATIVE  - NEGATIVE] (01/19/21 16:25:00)          UA Leukocyte Esterase: NEGATIVE [NEGATIVE  - NEGATIVE] (01/19/21 16:25:00)          dRVVT Scrn: 34 (01/19/21 16:25:00)          Lupus Anticoagulant: See comment (01/19/21  16:25:00)          PTT LA Scrn: 34 (01/19/21 16:25:00)          Beta 2 (B2) Glycoprotein IgA: <9 (01/19/21 16:25:00)          Beta 2 (B2) Glycoprotein IgG: <9 (01/19/21 16:25:00)          Beta 2 (B2) Glycoprotein IgM: <9 (01/19/21 16:25:00)          Cardiolipin IgG Ab: <14 (01/19/21 16:25:00)          Cardiolipin IgM Ab: <12 (01/19/21 16:25:00)          Cardiolipin IgA Ab: <11 (01/19/21 16:25:00)          Cardiolipin Ab Interp: See comment (01/19/21 16:25:00)          Phosphatidylserine Ab IgG: <10 (01/19/21 16:25:00)          Phosphatidylserine Ab IgM: <25 (01/19/21 16:25:00)          Phos Serine Ab IgA: <20 (01/19/21 16:25:00)          Factor V Leiden Mutation: See comment (01/19/21 16:25:00)          Factor V Leiden Mutation Interp: See comment (01/19/21 16:25:00)

## 2022-02-15 NOTE — PROGRESS NOTES
Patient:   ANURAG GARCIA            MRN: [id number]            FIN: [id number]               Age:   37 years     Sex:  Male     :  1980   Associated Diagnoses:   Ventral hernia   Author:   Jose Adams MD      Preoperative Information   Indication for surgery   Accompanied by   Source of history          Chief Complaint   2018 4:35 PM CDT     Pre-op  Ventral hernia repair at [place] with Dr. Thomas   see chief complaint as noted above and confirmed with the patient      Review of Systems   Constitutional:  No fever, No chills.    Eye   Ear/Nose/Mouth/Throat:  Nasal congestion (recent cold, now nearly gone), No sore throat.    Respiratory:  No shortness of breath, No cough.    Cardiovascular   Breast   Gastrointestinal:  No nausea, No vomiting, No diarrhea, No constipation.    Genitourinary:  No dysuria.    Gynecologic   Hematology/Lymphatics:  No bruising tendency, No swollen lymph glands.    Endocrine   Immunologic:  No recurrent fevers, No recurrent infections.    Musculoskeletal:  No muscle pain.    Integumentary:  No rash.    Neurologic:  No tingling, No headache.    Psychiatric   All other systems.     Health Status   Allergies:    Allergic Reactions (Selected)  No Known Medication Allergies   Medications:  (Selected)   Prescriptions  Prescribed  Auto-Titrating CPAP 4-16: Auto-Titrating CPAP 4-16, See Instructions, Instructions: Heated humidifier, heated tubing, filters, nasal or full face mask of choice with headgear.  Replacement cushions and supplies as needed.  Months = 99 (Lifetime), Supply, # 1 EA, 0 Refill(s), T...  Documented Medications  Documented  Enbrel: ( 25 mg ), Subcutaneous, 2x/wk, 0 Refill(s), Type: Maintenance   Problem list:    All Problems  Ankylosing spondylitis / 81030458 / Confirmed  Mechanical Low Back Pain / 724.2 / Confirmed  Obesity / 278.00 / Probable  JELANI (obstructive sleep apnea) / 665580798 / Confirmed  Umbilical hernia / 9336748294 / Confirmed  Anxiety /  93865748 / Confirmed  History of mononucleosis / 0880833669 / Confirmed  History of parotitis / 5761672749 / Confirmed  Resolved: Inpatient stay / [id number]  @[facility] - Tonsillitis, mononucleosis, parotitis      Histories   Past Medical History:    Active  History of mononucleosis (9373567165): Onset on 12/11/2016 at 36 years.  History of parotitis (3911157032): Onset on 12/11/2016 at 36 years.  JELANI (obstructive sleep apnea) (535508901): Onset on 8/7/2014 at 33 years.  Ankylosing spondylitis (41634765): Onset in the month of 10/2006 at 25 years  Mechanical Low Back Pain (724.2)  Resolved  Inpatient stay (985669689): Onset on 12/11/2016 at 36 years.  Resolved on 12/14/2016 at 36 years.  Comments:  12/20/2016 CST 6:42 AM CST - Mile Palomares  @[facility] - Tonsillitis, mononucleosis, parotitis   Family History:    Hypercholesterolemia  Mother  Father     Procedure history:    Polysomnogram (SNOMED CT [id number]) on 8/7/2014 at 33 Years.  Comments:  9/3/2014 1:39 PM - Kamla Cortes CMA  AHI of 4.2/hr.  REM AHI of 13   Social History:        Alcohol Assessment            Current, Once a week, 2 drinks/episode average.      Tobacco Assessment: Denies Tobacco Use            Never      Employment and Education Assessment            Employed, Work/School description: Phy.Ed./Health Teacher.      Home and Environment Assessment            Marital status:  (Living together).      Exercise and Physical Activity Assessment            Exercise frequency: 2-3 times/week.       Has no history of anemia.  Has  no history of DVT or pulmonary embolism.  Has  no personal history of bleeding problems.   Has  no personal or family history of anesthesia reactions.  Patient does not have active tuberculosis.    S/he  has not taken aspirin or aspirin containing products in the last week.     S/he  has not taken Plavix (Clopidogrel) in the last 2 weeks.    S/he  has not taken warfarin in the past week.    S/he has not been on corticosteroids  for more than 2 weeks recently.      S/he is not DNR before, during or after surgery.    Chest pain / SOB walking up 2 flights of steps? no  Pain in neck or jaw? no  CAD MI?   no  Afib? no  Heart Failure? no  Asthma  or Bronchitis?  no  Diabetes? no        Seizure Disorder? no  CKD? no  Thyroid Disease? no  Liver Disease  no  CVA?  no         Physical Examination   Vital Signs   4/9/2018 4:35 PM CDT Peripheral Pulse Rate 74 bpm    Systolic Blood Pressure 122 mmHg    Diastolic Blood Pressure 74 mmHg    Mean Arterial Pressure 90 mmHg      Measurements from flowsheet : Measurements   4/9/2018 4:35 PM CDT Height Measured - Standard 71.25 in    Weight Measured - Standard 224.2 lb    BSA 2.26 m2    Body Mass Index 31.05 kg/m2  HI      General:  Alert and oriented, No acute distress.    Eye:  Pupils are equal, round and reactive to light, Normal conjunctiva.    HENT:  Oral mucosa is moist.    Neck:  Supple.    Respiratory:  Lungs are clear to auscultation, Respirations are non-labored.    Cardiovascular:  Normal rate, Regular rhythm, Normal peripheral perfusion, No edema.    Gastrointestinal:  Non-distended, Normal bowel sounds, No organomegaly.    Musculoskeletal:  Normal gait.    Integumentary:  Warm, No rash.    Psychiatric:  Cooperative, Appropriate mood & affect, Normal judgment.       Impression and Plan   Diagnosis     Ventral hernia ([id number] K43.9).     Orders     no special concerns with surgery or anesthesia  .

## 2022-02-15 NOTE — TELEPHONE ENCOUNTER
---------------------  From: Familia Painter LPN (Phone Messages Pool ([id number]_[facility]))   To: [facility] Message Pool ([address]_[place]);     Sent: 4/29/2021 9:55:37 AM CDT  Subject: FW: FW: Different Medication           ---------------------  From: DIMITRIOS GARCIA  To: [facility]  Sent: 04/29/2021 09:25 a.m. CDT  Subject: RE: FW: Different Medication  Thanks Angi, yes, he can just reach out or if I need to set up an appointment let me know.    Dimitrios  ---------------------  From: Angi Loaiza (Phone Messages Pool ([id number]_[facility]))  To: DIMITRIOS GARCIA  Sent: 4/29/2021 8:33:00 AM CDT  Subject: FW: Different Medication       Dr. Bryan Santos is not in clinic today or tomorrow but will be back on Monday. Is this ok to wait until then?       Angi                 ---------------------  From: DIMITRIOS GARCIA  To: [facility]  Sent: 04/29/2021 07:54 a.m. CDT  Subject: Different Medication  Zohaib Adams, I think I need to change my anxiety Rx. I am having difficulty keeping an erection  and finishing sex.  I read that what I am on might cause that to happen. I am not sure though if its that or my blood clotting medication. Please let me know what you think.---------------------  From: Ivana Marquez CMA ([facility] Message Pool (62149_[facility]))   To: Jose Adams MD;     Sent: 4/29/2021 10:35:05 AM CDT  Subject: FW: FW: Different Medication---------------------  From: Jose Adams MD   To: [employer] Message Pool (32224_[place]);     Sent: 4/29/2021 11:05:41 AM CDT  Subject: RE: FW: Different Medication     option 1 reduce to 10mg citalopram    50% chance this will help  option 2   Wellbutrin sr 150mg po bid  start 1 per day for 2 weeks.   rarely does this have sexual side effects but at first the anxiety can be a little worse before it improves.---------------------  From: Ivana Marquez CMA ([employer] Message Pool (32224_[place]))   To: ANURAG GARCIA    Sent: 4/29/2021 12:44:48 PM CDT  Subject: FW: FW: Different Medication     Hi Dr. Bryan Plunkett is checking his messages and responded to you. Please see his message and let me know what you would like to do.    Tosin

## 2022-02-15 NOTE — TELEPHONE ENCOUNTER
---------------------  From: Jose Adams MD   To: ANURAG GARCIA    Sent: 12/1/2021 9:02:52 AM CST  Subject: General Message     Your blood tests are good.  The LDL ideal is 70 to 100 but 117 is OK      Results:  Date Result Name Ind Value Ref Range   11/29/2021 4:45 PM Protein Total  7.2 gm/dL (6.1 - 8.1)   11/29/2021 4:45 PM Albumin Level  4.3 gm/dL (3.6 - 5.1)   11/29/2021 4:45 PM Globulin  2.9 (1.9 - 3.7)   11/29/2021 4:45 PM A/G Ratio  1.5 (1.0 - 2.5)   11/29/2021 4:45 PM Bilirubin Total  0.5 mg/dL (0.2 - 1.2)   11/29/2021 4:45 PM Bilirubin Direct  0.1 mg/dL ( - < OR = 0.2)   11/29/2021 4:45 PM Bilirubin Indirect  0.4 (0.2 - 1.2)   11/29/2021 4:45 PM Alkaline Phosphatase  64 unit/L (36 - 130)   11/29/2021 4:45 PM AST/SGOT  17 unit/L (10 - 40)   11/29/2021 4:45 PM ALT/SGPT  19 unit/L (9 - 46)   11/29/2021 4:45 PM WBC  6.5 (3.8 - 10.8)   11/29/2021 4:45 PM RBC  5.19 (4.20 - 5.80)   11/29/2021 4:45 PM Hgb  16.1 gm/dL (13.2 - 17.1)   11/29/2021 4:45 PM Hct  44.7 % (38.5 - 50.0)   11/29/2021 4:45 PM MCV  86.1 fL (80.0 - 100.0)   11/29/2021 4:45 PM MCH  31.0 pg (27.0 - 33.0)   11/29/2021 4:45 PM MCHC  36.0 gm/dL (32.0 - 36.0)   11/29/2021 4:45 PM RDW  12.7 % (11.0 - 15.0)   11/29/2021 4:45 PM Platelet  356 (140 - 400)   11/29/2021 4:45 PM MPV  10.2 fL (7.5 - 12.5)   11/29/2021 4:45 PM LDL Direct ((H)) 117 mg/dL ( - <100)   11/29/2021 4:45 PM Glucose Level  91 mg/dL (65 - 99)   11/29/2021 4:45 PM BUN  15 mg/dL (7 - 25)   11/29/2021 4:45 PM Creatinine Level  1.26 mg/dL (0.60 - 1.35)   11/29/2021 4:45 PM eGFR  71 mL/min/1.73m2 (> OR = 60 - )   11/29/2021 4:45 PM eGFR African American  82 mL/min/1.73m2 (> OR = 60 - )   11/29/2021 4:45 PM BUN/Creat Ratio  NOT APPLICABLE (6 - 22)   11/29/2021 4:45 PM Sodium Level  136 mmol/L (135 - 146)   11/29/2021 4:45 PM Potassium Level  4.2 mmol/L (3.5 - 5.3)   11/29/2021 4:45 PM Chloride Level  102 mmol/L (98 - 110)   11/29/2021 4:45 PM CO2 Level  25 mmol/L (20 - 32)    11/29/2021 4:45 PM Calcium Level  9.6 mg/dL (8.6 - 10.3)

## 2022-02-15 NOTE — TELEPHONE ENCOUNTER
---------------------  From: Anahi/Angi GARCIA (Phone Messages Pool ([id number]_[facility]))   To: [facility] Message Pool ([address]_[place]);     Sent: 2/24/2020 2:50:35 PM CST  Subject: Blood Clot      Phone Message    PCP: JEAN MARIE    Time of Call: 1440    Phone Number: [phone]      Note: Patient called stating that he was seen 2/23/20. Patient has a blood clot in his lower L leg. Patient states he wants to f/u with [facility]. Patient doesn't know why he has a blood clot and he is wondering if JEAN MARIE had any idea as to why he has one. Patient also wondering what he can do about this. Patient also wanting to know how big the clot is. Patient is going to schedule an appointment with [facility] to f/u. Please make sure to help him schedule an appointment when he is called back.    Please Advise.      Last office visit and reason: 2/23/20 DVT    Transferred to: [facility] Message Pool---------------------  From: Ivana Marquez CMA ([facility] Message Pool ([id number]_[facility]))   To: Jose Adams MD;     Sent: 2/24/2020 3:00:28 PM CST  Subject: FW: Blood Clot---------------------  From: Jose Adams MD   To: [facility] Message Pool ([id number]_[place]);     Sent: 2/24/2020 3:12:15 PM CST  Subject: RE: Blood Clot      it can be caused by just bad luck but he needs some blood tests to look for underlying clotting problems.   the most common cause overall is prolonged sittingLMTCB at 3:56pmPt called/LM at 1608, returning Tosin's call.Pt called and informed at 5:13pm

## 2022-02-15 NOTE — NURSING NOTE
Comprehensive Intake Entered On:  4/15/2021 4:00 PM CDT    Performed On:  4/15/2021 3:54 PM CDT by Ivana Marquez CMA               Summary   Chief Complaint :   Discuss Anxiety   Weight Measured :   227 lb(Converted to: 227 lb 0 oz, 102.965 kg)    Height Measured :   71.25 in(Converted to: 5 ft 11 in, 180.97 cm)    Body Mass Index :   31.43 kg/m2 (HI)    Body Surface Area :   2.27 m2   Systolic Blood Pressure :   124 mmHg   Diastolic Blood Pressure :   66 mmHg   Mean Arterial Pressure :   85 mmHg   Peripheral Pulse Rate :   72 bpm   Ivana Marquez CMA - 4/15/2021 3:54 PM CDT   Health Status   Allergies Verified? :   Yes   Medication History Verified? :   Yes   Pre-Visit Planning Status :   Completed   Tobacco Use? :   Never smoker   Ivana Marquez CMA - 4/15/2021 3:54 PM CDT   Consents   Consent for Immunization Exchange :   Consent Granted   Consent for Immunizations to Providers :   Consent Granted   Ivana Marquez CMA - 4/15/2021 3:54 PM CDT   Meds / Allergies   (As Of: 4/15/2021 4:00:01 PM CDT)   Allergies (Active)   No Known Medication Allergies  Estimated Onset Date:   Unspecified ; Created By:   Carson Kramer CMA; Reaction Status:   Active ; Category:   Drug ; Substance:   No Known Medication Allergies ; Type:   Allergy ; Updated By:   Carson Kramer CMA; Reviewed Date:   1/19/2021 3:40 PM CST        Medication List   (As Of: 4/15/2021 4:00:01 PM CDT)   Prescription/Discharge Order    apixaban  :   apixaban ; Status:   Prescribed ; Ordered As Mnemonic:   Eliquis 5 mg oral tablet ; Simple Display Line:   5 mg, Oral, bid, 60 tab(s), 3 Refill(s) ; Ordering Provider:   Nael Chambers MD; Catalog Code:   apixaban ; Order Dt/Tm:   1/19/2021 4:17:25 PM CST          Miscellaneous Rx Supply  :   Miscellaneous Rx Supply ; Status:   Prescribed ; Ordered As Mnemonic:   Auto-Titrating CPAP 4-16 ; Simple Display Line:   See Instructions, Heated humidifier, heated tubing, filters, nasal  or full face mask of choice with headgear.  Replacement cushions and supplies as needed.  Months = 99 (Lifetime), 1 EA, 0 Refill(s) ; Ordering Provider:   Jose Adams MD; Catalog Code:   Miscellaneous Rx Supply ; Order Dt/Tm:   3/13/2018 4:50:29 PM CDT            Home Meds    etanercept  :   etanercept ; Status:   Documented ; Ordered As Mnemonic:   Enbrel ; Simple Display Line:   25 mg, Subcutaneous, 2x/wk, 0 Refill(s) ; Catalog Code:   etanercept ; Order Dt/Tm:   5/11/2010 5:42:16 PM CDT            ID Risk Screen   Recent Travel History :   No recent travel   Family Member Travel History :   No recent travel   Other Exposure to Infectious Disease :   Unknown   COVID-19 Testing Status :   No COVID-19 test performed   Ivana Marquez CMA - 4/15/2021 3:54 PM CDT   Depression Screening   Little Interest - Pleasure in Activities :   More than half the days   Feeling Down, Depressed, Hopeless :   More than half the days   Initial Depression Screen Score :   4 Score   Poor Appetite or Overeating :   More than half the days   Trouble Falling or Staying Asleep :   More than half the days   Feeling Tired or Little Energy :   More than half the days   Feeling Bad About Yourself :   More than half the days   Trouble Concentrating :   More than half the days   Moving or Speaking Slowly :   Not at all   Thoughts Better Off Dead or Hurting Self :   Several days   Difficulty at Work, Home, Getting Along :   Somewhat difficult   Detailed Depression Screen Score :   11    Total Depression Screen Score :   15    Ivana Marquez CMA - 4/15/2021 3:54 PM CDT

## 2022-02-15 NOTE — PROGRESS NOTES
Patient:   ANURAG GARCIA            MRN: [id number]            FIN: [id number]               Age:   37 years     Sex:  Male     :  1980   Associated Diagnoses:   Keratosis, seborrheic   Author:   Jose Adams MD      Chief Complaint   10/10/2018 4:29 PM CDT   Check some spots on skin, one on left side of temple, one on belly, and a few on lower legs      History of Present Illness   see chief complaint as noted above and confirmed with the patient     37 year old male presents today to have a few  spots checked. He recently noticed a spot on left temple, describes it as a grey spot that has been present for a while. Also has one on his stomach on the right side of his stomach, he says he notices these small spots pop up in random spots. He is concerned with the spots. He is on Enbrel for Ankylosing Spondylitis, he says his back is feeling good, he was going to the gym often, he has slowed down some now yet would like to get back into it. He does stretch and tries to stay moving and active.       Review of Systems   Constitutional:  No fever.    Hematology/Lymphatics:  No bruising tendency, No bleeding tendency.    Psychiatric:  No anxiety.       Health Status   Allergies:    Allergic Reactions (Selected)  No Known Medication Allergies   Medications:  (Selected)   Prescriptions  Prescribed  Auto-Titrating CPAP 4-16: Auto-Titrating CPAP 4-16, See Instructions, Instructions: Heated humidifier, heated tubing, filters, nasal or full face mask of choice with headgear.  Replacement cushions and supplies as needed.  Months = 99 (Lifetime), Supply, # 1 EA, 0 Refill(s), T...  Documented Medications  Documented  Enbrel: ( 25 mg ), Subcutaneous, 2x/wk, 0 Refill(s), Type: Maintenance   Problem list:    All Problems  Umbilical hernia / SNOMED CT 0042718250 / Confirmed  JELANI (obstructive sleep apnea) / SNOMED CT 492771641 / Confirmed  Obesity / ICD-9-.00 / Probable  Mechanical Low Back Pain / ICD-9-.2 /  Confirmed  History of parotitis / SNOMED CT 8113226475 / Confirmed  History of mononucleosis / SNOMED CT 4712488110 / Confirmed  Anxiety / SNOMED CT 78387618 / Confirmed  Ankylosing spondylitis / SNOMED CT 43563255 / Confirmed  Resolved: Inpatient stay / SNOMED CT 473513702      Histories   Past Medical History:    Active  History of mononucleosis (1643134607): Onset on 12/11/2016 at 36 years.  History of parotitis (7432565012): Onset on 12/11/2016 at 36 years.  JELANI (obstructive sleep apnea) (810213866): Onset on 8/7/2014 at 33 years.  Ankylosing spondylitis (51863319): Onset in the month of 10/2006 at 25 years  Mechanical Low Back Pain (724.2)  Resolved  Inpatient stay (895049862): Onset on 12/11/2016 at 36 years.  Resolved on 12/14/2016 at 36 years.  Comments:  12/20/2016 CST 6:42 AM CST - Mile Palomares  @[facility] - Tonsillitis, mononucleosis, parotitis   Family History:    Hypercholesterolemia  Mother  Father     Procedure history:    Repair of ventral hernia (425667580) on 4/19/2018 at 37 Years.  Polysomnogram (201609076) on 8/7/2014 at 33 Years.  Comments:  9/3/2014 1:39 PM - Kamla Cortes CMA  AHI of 4.2/hr.  REM AHI of 13   Social History:        Alcohol Assessment            Current, Once a week, 2 drinks/episode average.      Tobacco Assessment: Denies Tobacco Use            Never      Employment and Education Assessment            Employed, Work/School description: Phy.Ed./Health Teacher.      Home and Environment Assessment            Marital status:  (Living together).      Exercise and Physical Activity Assessment            Exercise frequency: 2-3 times/week.        Physical Examination   Vital Signs   10/10/2018 4:29 PM CDT Peripheral Pulse Rate 109 bpm  HI    Pulse Site Radial artery    Systolic Blood Pressure 128 mmHg    Diastolic Blood Pressure 88 mmHg  HI    Mean Arterial Pressure 101 mmHg    BP Site Right arm    Oxygen Saturation 95 %      Measurements from flowsheet : Measurements    10/10/2018 4:29 PM CDT Height Measured - Standard 71.25 in    Weight Measured - Standard 230 lb    BSA 2.29 m2    Body Mass Index 31.85 kg/m2  HI      General:  Alert and oriented, No acute distress.    Eye:  Pupils are equal, round and reactive to light, Normal conjunctiva.    HENT:  Oral mucosa is moist.    Neck:  Supple.    Respiratory:  Respirations are non-labored.    Cardiovascular:  Normal rate, Regular rhythm, No edema.    Gastrointestinal:  Non-distended.    Musculoskeletal:  Normal gait.    Integumentary:  Warm, No rash.    Psychiatric:  Cooperative, Appropriate mood & affect, Normal judgment.       Review / Management   Results review      Impression and Plan       Diagnosis     Keratosis, seborrheic (SZH33-BZ L82.1).     Course:  I reviewed with him and his immunosuppressive therapy he is at risk to develop skin lesions and specifically skin cancer in any lesion that's growing or changing should be reevaluated.    I, Edwina Izquierdo Medical Assistant acted solely as a scribe for, and in presence of Dr. Jose Adams who performed the services.

## 2022-02-15 NOTE — TELEPHONE ENCOUNTER
---------------------  From: Ivana Marquez CMA ([facility] Message Pool ([id number]_[facility]))   To: DIMITRIOS GARCIA    Sent: 5/3/2021 11:28:32 AM CDT  Subject: RE: FW: FW: Different Medication     Good Morning Dimitrios I sent in the Wellbutrin for you to start. Please let us know how you are doing on this in the next couple weeks.    Tosin      ---------------------  From: DIMITRIOS GARCIA  To: [facility] Message Pool (32224_[facility])  Sent: 05/03/2021 11:16 a.m. CDT  Subject: RE: FW: FW: Different Medication  Can we try option #2 please. Thank you       Addendum by Ivana Marquez CMA on April 29, 2021 12:44:48 PM CDT  ---------------------  From: Ivana Marquez CMA ([facility] Message Pool ([id number]_[facility]))  To: DIMITRIOS GARCIA  Sent: 4/29/2021 12:44:48 PM CDT  Subject: FW: FW: Different Medication       Hi Dr. Bryan Plunkett is checking his messages and responded to you. Please see his message and let me know what you would like to do.       Tosin       Addendum by Jose Adams MD on April 29, 2021 11:05:41 AM CDT  ---------------------  From: Jose Adams MD  To: [facility] [employer] Pool (32224_[facility]);  Sent: 4/29/2021 11:05:41 AM CDT  Subject: RE: FW: Different Medication       option 1 reduce to 10mg citalopram    50% chance this will help  option 2   Wellbutrin sr 150mg po bid  start 1 per day for 2 weeks.   rarely does this have sexual side effects but at first the anxiety can be a little worse before it improves.       Addendum by Ivana Marquez CMA on April 29, 2021 10:35:05 AM CDT  ---------------------  From: Ivana Marquez CMA ([facility] Message Pool (32224_[facility]))  To: Jose Adams MD;  Sent: 4/29/2021 10:35:05 AM CDT  Subject: FW: FW: Different Medication  ---------------------  From: Familia Painter LPN (Phone Messages Pool (32224_[facility]))  To: [facility] Message Pool (32224_[facility]);  Sent: 4/29/2021 9:55:37 AM CDT  Subject: FW: FW:  Different Medication                      ---------------------  From: DIMITRIOS GARCIA  To: [facility]  Sent: 04/29/2021 09:25 a.m. CDT  Subject: RE: FW: Different Medication  Thanks Angi, yes, he can just reach out or if I need to set up an appointment let me know.  Dimitrios  ---------------------  From: Anahi/Angi GARCIA (Phone Messages [employer] (32224_[facility]))  To: DIMITRIOS GARCIA  Sent: 4/29/2021 8:33:00 AM CDT  Subject: FW: Different Medication       Dr. Bryan Santos is not in clinic today or tomorrow but will be back on Monday. Is this ok to wait until then?       Angi                 ---------------------  From: DIMITRIOS GARCIA  To: [facility]  Sent: 04/29/2021 07:54 a.m. CDT  Subject: Different Medication  oZhaib Adams, I think I need to change my anxiety Rx. I am having difficulty keeping an erection  and finishing sex.  I read that what I am on might cause that to happen. I am not sure though if its that or my blood clotting medication. Please let me know what you think.

## 2022-02-15 NOTE — TELEPHONE ENCOUNTER
---------------------  From: Jose Adams MD   To: ANURAG GARCIA    Sent: 5/28/2020 3:37:02 PM CDT  Subject: Patient Message - Results Notification          Your test is negative as I told you on the phone      Results:  Date Result Name Value   5/27/2020 3:08 PM Coronavirus SARS-CoV-2 (COVID-19) Ab IgG NEGATIVE

## 2022-02-15 NOTE — LETTER
(Inserted Image. Unable to display)           [address] [place] [address]  [phone]    September 20, 2021      ANURAG GARCIA      [address] [place] [address]   [place] [address]        Dear ANURAG,    Thank you for selecting [facility] for your healthcare needs. Below you will find the result of your recent test(s) done at our clinic.     These were warts. We can discuss options at your suture removal visit.      Result Name Current Result   Tissue, 2 Specimen   9/14/2021       Please contact my practice at [phone] if you have any questions or concerns.      Sincerely,        Wai Vazquez MD ,   Jessie James MD,   Isra Andrews PA-C                  What do you labs mean?  Below is a glossary of commonly ordered labs:  LDL - Bad Cholesterol HDL - Good Cholesterol  AST/ALT - Liver Function Cr/creatinine - Kidney Function  Microalbumin - Kidney Function  TSH - Thyroid Function PSA- Prostate  Hgb - iron stores/blood count Hgb A1c - Diabetic control

## 2022-02-15 NOTE — TELEPHONE ENCOUNTER
---------------------  From: Gege SNEED, Lori VALDEZ   To: [employer] Message Pool (32224_[facility]); Phone Messages Pool (32224_[place]);     Sent: 6/4/2020 2:09:16 PM CDT  Subject: General Message     I received request to refill Eliquis for DVT (which I did).  However, please contact pt to schedule follow up with [facility] (video appt should be fine to discuss) duration of treatment.  Initial plan was 3-6 months and he is at 3 months.[email or web address] to call back

## 2022-02-15 NOTE — PROGRESS NOTES
Patient:   ANURAG GARCIA            MRN: [id number]            FIN: [id number]               Age:   37 years     Sex:  Male     :  1980   Associated Diagnoses:   JELANI (obstructive sleep apnea)   Author:   Jose Adams MD      Visit Information      Date of Service: 2018 04:26 pm  Performing Location: [facility]  Encounter#: [id number]      Primary Care Provider (PCP):  Jose Adams MD    NPI# [id number]      Referring Provider:  Jose Adams MD    NPI# [id number]      Chief Complaint   3/13/2018 4:29 PM CDT    f/u sleep study      Subjective   Chief complaint 3/13/2018 4:29 PM CDT    f/u sleep study.     This 37 year old is following up after having a home sleep test.  The patient has had significant sleepiness and some mood disorder.  He did have a sleep test in .  At that time his AHI was 4.2.  The recent home sleep test, because of worsening symptoms, demonstrated an AHI of 10.  The patient feels like he is active.  He has not had any trouble breathing through his nose.  He does have ankylosing spondylitis that has not affected his sleep in obvious ways except for some occasional pain.  He is on Enbrel.  He is a nonsmoker.  He does not have allergies.         Health Status   Allergies:    Allergic Reactions (Selected)  No Known Medication Allergies   Medications:  (Selected)   Prescriptions  Prescribed  Auto-Titrating CPAP 4-16: Auto-Titrating CPAP 4-16, See Instructions, Instructions: Heated humidifier, heated tubing, filters, nasal or full face mask of choice with headgear.  Replacement cushions and supplies as needed.  Months = 99 (Lifetime), Supply, # 1 EA, 0 Refill(s), T...  Tamiflu 75 mg oral capsule: 1 cap(s) ( 75 mg ), PO, BID, # 10 cap(s), 0 Refill(s), Type: Maintenance, Pharmacy: [id number], 1 cap(s) po bid,x5 day(s)  Documented Medications  Documented  Enbrel: ( 25 mg ), Subcutaneous, 2x/wk, 0 Refill(s), Type: Maintenance   Problem list:    All  Problems (Selected)  Ankylosing spondylitis / 80648177 / Confirmed  Mechanical Low Back Pain / 724.2 / Confirmed  Obesity / 278.00 / Probable  JELANI (obstructive sleep apnea) / 377993440 / Confirmed  Umbilical hernia / 0603868152 / Confirmed  Anxiety / 38831711 / Confirmed  History of mononucleosis / 7968499248 / Confirmed  History of parotitis / 2465783535 / Confirmed      Objective   Vital Signs   3/13/2018 4:29 PM CDT Peripheral Pulse Rate 64 bpm    Systolic Blood Pressure 112 mmHg    Diastolic Blood Pressure 70 mmHg    Mean Arterial Pressure 84 mmHg      Measurements from flowsheet : Measurements   3/13/2018 4:29 PM CDT Height Measured - Standard 71.25 in    Weight Measured - Standard 229.2 lb    BSA 2.28 m2    Body Mass Index 31.74 kg/m2  HI      General:  Alert and oriented, No acute distress.    Eye:  Normal conjunctiva.    Neck:  Supple, Non-tender.    Respiratory:  Lungs are clear to auscultation, Respirations are non-labored.    Cardiovascular:  Normal rate, Regular rhythm, Normal peripheral perfusion.    Gastrointestinal:  Soft, Non-tender.    Integumentary:  Warm, No rash.    Psychiatric:  Cooperative, Appropriate mood & affect, Normal judgment.       Impression and Plan   Assessment and Plan:          Diagnosis: JELANI (obstructive sleep apnea) (CCJ46-RX G47.33).         Course: Patient with probable moderate obstructive sleep apnea that is causing him sleepiness.  I think he would be a good candidate for autotitrating CPAP.  He agrees and seems to understand the disease and what we are trying to do with treatment as well as the different treatment options.  .

## 2022-02-15 NOTE — NURSING NOTE
Comprehensive Intake Entered On:  11/4/2019 3:08 PM CST    Performed On:  11/4/2019 3:04 PM CST by Flori Martinez               Summary   Chief Complaint :   Pt c/o right ear pain for 4 days.    Weight Measured :   234 lb(Converted to: 234 lb 0 oz, 106.14 kg)    Height Measured :   71.25 in(Converted to: 5 ft 11 in, 180.97 cm)    Body Mass Index :   32.4 kg/m2 (HI)    Body Surface Area :   2.31 m2   Systolic Blood Pressure :   110 mmHg   Diastolic Blood Pressure :   70 mmHg   Mean Arterial Pressure :   83 mmHg   Peripheral Pulse Rate :   82 bpm   Temperature Tympanic :   97.2 DegF(Converted to: 36.2 DegC)  (LOW)    Flori Martinez - 11/4/2019 3:04 PM CST   Health Status   Allergies Verified? :   Yes   Medication History Verified? :   Yes   Medical History Verified? :   Yes   Pre-Visit Planning Status :   Completed   Tobacco Use? :   Never smoker   Flori Martinez - 11/4/2019 3:04 PM CST   Meds / Allergies   (As Of: 11/4/2019 3:08:48 PM CST)   Allergies (Active)   No Known Medication Allergies  Estimated Onset Date:   Unspecified ; Created By:   Carson Kramer CMA; Reaction Status:   Active ; Category:   Drug ; Substance:   No Known Medication Allergies ; Type:   Allergy ; Updated By:   Carson Kramer CMA; Reviewed Date:   11/4/2019 3:06 PM CST        Medication List   (As Of: 11/4/2019 3:08:48 PM CST)   Prescription/Discharge Order    Miscellaneous Rx Supply  :   Miscellaneous Rx Supply ; Status:   Prescribed ; Ordered As Mnemonic:   Auto-Titrating CPAP 4-16 ; Simple Display Line:   See Instructions, Heated humidifier, heated tubing, filters, nasal or full face mask of choice with headgear.  Replacement cushions and supplies as needed.  Months = 99 (Lifetime), 1 EA, 0 Refill(s) ; Ordering Provider:   Jose Adams MD; Catalog Code:   Miscellaneous Rx Supply ; Order Dt/Tm:   3/13/2018 4:50:29 PM CDT            Home Meds    etanercept  :   etanercept ; Status:   Documented ; Ordered As  Mnemonic:   Enbrel ; Simple Display Line:   25 mg, Subcutaneous, 2x/wk, 0 Refill(s) ; Catalog Code:   etanercept ; Order Dt/Tm:   5/11/2010 5:42:16 PM CDT

## 2022-02-15 NOTE — NURSING NOTE
Comprehensive Intake Entered On:  9/24/2021 2:44 PM CDT    Performed On:  9/24/2021 2:40 PM CDT by Neyda Worley CMA               Summary   Chief Complaint :   suture removal   Weight Measured :   228.1 lb(Converted to: 228 lb 2 oz, 103.464 kg)    Height Measured :   71.25 in(Converted to: 5 ft 11 in, 180.97 cm)    Body Mass Index :   31.59 kg/m2 (HI)    Body Surface Area :   2.28 m2   Systolic Blood Pressure :   120 mmHg   Diastolic Blood Pressure :   72 mmHg   Mean Arterial Pressure :   88 mmHg   Peripheral Pulse Rate :   79 bpm   BP Site :   Right arm   BP Method :   Manual   HR Method :   Electronic   Oxygen Saturation :   99 %   Neyda Worley CMA - 9/24/2021 2:40 PM CDT   Health Status   Allergies Verified? :   Yes   Medication History Verified? :   Yes   Medical History Verified? :   Yes   Tobacco Use? :   Never smoker   Neyda Worley CMA - 9/24/2021 2:40 PM CDT   Consents   Consent for Immunization Exchange :   Consent Granted   Consent for Immunizations to Providers :   Consent Granted   Neyda Worley CMA - 9/24/2021 2:40 PM CDT   Meds / Allergies   (As Of: 9/24/2021 2:44:23 PM CDT)   Allergies (Active)   No Known Medication Allergies  Estimated Onset Date:   Unspecified ; Created By:   Carson Kramer CMA; Reaction Status:   Active ; Category:   Drug ; Substance:   No Known Medication Allergies ; Type:   Allergy ; Updated By:   Carson Kramer CMA; Reviewed Date:   9/24/2021 2:41 PM CDT        Medication List   (As Of: 9/24/2021 2:44:23 PM CDT)   Prescription/Discharge Order    Miscellaneous Rx Supply  :   Miscellaneous Rx Supply ; Status:   Prescribed ; Ordered As Mnemonic:   Auto-Titrating CPAP 4-16 ; Simple Display Line:   See Instructions, Heated humidifier, heated tubing, filters, nasal or full face mask of choice with headgear.  Replacement cushions and supplies as needed.  Months = 99 (Lifetime), 1 EA, 0 Refill(s) ; Ordering Provider:   Jose Adams MD; Catalog Code:   Miscellaneous Rx  Supply ; Order Dt/Tm:   3/13/2018 4:50:29 PM CDT          buPROPion  :   buPROPion ; Status:   Prescribed ; Ordered As Mnemonic:   BuPROPion (Eqv-Wellbutrin SR) 150 mg/12 hours oral tablet, extended release ; Simple Display Line:   1 tab(s), Oral, bid, for 90 day(s), 180 tab(s), 0 Refill(s) ; Ordering Provider:   Jose Adams MD; Catalog Code:   buPROPion ; Order Dt/Tm:   8/27/2021 2:42:01 PM CDT          rivaroxaban  :   rivaroxaban ; Status:   Prescribed ; Ordered As Mnemonic:   Xarelto 20 mg oral tablet ; Simple Display Line:   20 mg, 1 tab(s), Oral, qpm, for 90 day(s), 90 tab(s), 1 Refill(s) ; Ordering Provider:   Jose Adams MD; Catalog Code:   rivaroxaban ; Order Dt/Tm:   4/15/2021 4:40:49 PM CDT            Home Meds    etanercept  :   etanercept ; Status:   Documented ; Ordered As Mnemonic:   Enbrel ; Simple Display Line:   25 mg, Subcutaneous, 2x/wk, 0 Refill(s) ; Catalog Code:   etanercept ; Order Dt/Tm:   5/11/2010 5:42:16 PM CDT

## 2022-02-15 NOTE — NURSING NOTE
Comprehensive Intake Entered On:  2/23/2020 12:46 PM CST    Performed On:  2/23/2020 12:45 PM CST by Carolynn Hall               Summary   Chief Complaint :   Extreme pain in left calf x2 days.    Weight Measured :   226.8 lb(Converted to: 226 lb 13 oz, 102.87 kg)    Height Measured :   71.25 in(Converted to: 5 ft 11 in, 180.97 cm)    Body Mass Index :   31.41 kg/m2 (HI)    Body Surface Area :   2.27 m2   Systolic Blood Pressure :   108 mmHg   Diastolic Blood Pressure :   74 mmHg   Mean Arterial Pressure :   85 mmHg   Peripheral Pulse Rate :   85 bpm   Temperature Tympanic :   97.0 DegF(Converted to: 36.1 DegC)  (LOW)    Oxygen Saturation :   97 %   Carolynn Hall - 2/23/2020 12:45 PM CST

## 2022-02-15 NOTE — PROGRESS NOTES
Chief Complaint    Extreme pain in left calf x2 days.  History of Present Illness      Chief complaint as above reviewed and confirmed with patient.  Pt presents to the clinic with concerns re: leg pain.  Pain is insidious onset, moderate to severe x 2 days. Has noted some swelling but no redness.  It is fairly well localized to the L lower medial leg.  Pt denies trauma, fever, immobilization, or recent travel. No sob or difficulty breathing. No personal or family hx of coagulopathy.  No hx of cancer.  Known history of ankylosing spondylitis well controlled with Embrel.   Review of Systems      Review of systems is negative with the exception of those noted in HPI          Physical Exam   Vitals & Measurements    T: 97.0   F (Tympanic)  HR: 85(Peripheral)  BP: 108/74  SpO2: 97%     HT: 71.25 in  WT: 226.8 lb  BMI: 31.41       nad appears well      ambulates with antalgic gait on the L.       Exam of the LLE reveals medial calf swelling approximately mid 1/3 of the lower leg, that is TTP. No foot or ankle swelling.  Homans test is negative. No pain with AROM at the ankle in PF, DF inversion or eversion.       no erythema. no popliteal area tenderness.       sensation and peripheral pulses intact. cap refill brisk.       Duplex US of the LLE reveals DVT of theL posterior tibial vein extending from ankle to mid calf.  Does not extend to the popliteal space.  Assessment/Plan       1. DVT of lower limb, acute (I82.409)         Discussed with pt consideration of anticoagulation given severity of symptoms, size,  although it is relative lower risk for PE given distal location.  Pt agreeable.  Eliquis as ordered given formulary.          This is an unprovoked DVT. Recommend he follow up with his pcp to discuss future work up if indicated as well as duration of treatment 3-6 months.  Pt agreeable with plan.  discussed elevation, relative rest, avoiding NSAIDS.  Tylenol for pain.                Orders:         apixaban, See  Instructions, Instructions: 2 po bid x 7 days, 1 po bid thereafter, # 60 tab(s), 2 Refill(s), Type: Maintenance, Pharmacy: [id number], 2 po bid x 7 days, 1 po bid thereafter, (Ordered)         US Lower Extremity Venous Doppler (Request), Priority: STAT, Instructions: Left, Left leg pain  Patient Information     Name:ANURAG GARCIA      Address:      [address] [place] [id number]     Sex:Male     YOB: 1980     [phone]     Emergency Contact:VIJI GARCIA     MRN:[id number]     FIN:[id number]     Location:[facility]     Date of Service:02/23/2020      Primary Care Physician:       Jose Adams MD, [phone]      Attending Physician:       Lori De Guzman PA-C, [phone]  Problem List/Past Medical History    Ongoing     Ankylosing spondylitis     Anxiety     History of mononucleosis     History of parotitis     Mechanical Low Back Pain     Obesity     JELANI (obstructive sleep apnea)     Umbilical hernia    Historical     Inpatient stay       Comments: @[facility] - Tonsillitis, mononucleosis, parotitis  Procedure/Surgical History     Repair of ventral hernia (04/19/2018)     Polysomnogram (08/07/2014)      Comments: AHI of 4.2/hr.  REM AHI of 13.  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  Immunizations      Vaccine Date Status          tetanus/diphth/pertuss (Tdap) adult/adol 10/10/2018 Given          pneumococcal (PCV13) 10/10/2018 Given          influenza virus vaccine, inactivated 10/02/2018 Given          influenza virus vaccine, inactivated 10/27/2017 Given          influenza 11/18/2016 Recorded          influenza virus vaccine, inactivated 12/02/2015 Given          influenza virus vaccine, inactivated 10/10/2013 Given          influenza virus vaccine, inactivated 09/19/2012 Given          tetanus/diphth/pertuss (Tdap) adult/adol 04/16/2008 Recorded

## 2022-02-15 NOTE — TELEPHONE ENCOUNTER
---------------------  From: Anahi/Angi GARCIA (Phone Messages Pool ([id number]_[facility]))   To: Anyi Yo MD;     Sent: 5/21/2020 12:06:49 PM CDT  Subject: General Message     Phone Message    PCP: JEAN MARIE    Time of Call: 1151    Phone Number: [phone]    Returned call at: 1204    Note: Patient calling stating that he was in 2 mo ago and he was tested for different things. It turns out he had a blood clot. Patient stated he heard from a few different studies that blood clots can be a symptom of COVID. Patient wondering if he can get the antibody test. Please advise if ok to order.     [facility]     Last office visit and reason: 2/27/20 DVT    Transferred to: [facility]---------------------  From: Anyi Yo MD   To: [employer] ([id number]_[place] [facility]);     Sent: 5/21/2020 1:45:55 PM CDT  Subject: RE: General Message     yes, ok to order, billing is doen through [facility] not vibrantPatient notified and transferred to scheduling.

## 2022-02-15 NOTE — TELEPHONE ENCOUNTER
---------------------  From: Carolynn Hall (hospitals Message Pool ([id number]_[facility]))   To: hospitals Message Pool ([id number]_[place] [facility]);     Sent: 1/26/2021 3:09:48 PM CST  Subject: Results Follow Up     Left message for patient to call back at: 3:09pm      ---------------------  From: Nael Chambers MD   To: [employer] Message Pool ([id number]_[place] [facility]);     Sent: 1/25/2021 11:09:59 AM CST ! Show up: 1/25/2021 11:09:59 AM CST  Subject: Results Follow Up   Actions: Notify the patient with results    Due Date/Time: 1/25/2021 11:38:00 AM CST               Reminder Comments:  Let him know the Factor V Leiden test(hereditary clotting disorder0 is abnormal Will discuss at his follow up but he will need to stay on blod thinners indefinetly    Results:  Date Result Name Ind Value Ref Range   1/19/2021 4:25 PM Sodium Level  138 mmol/L (135 - 146)   1/19/2021 4:25 PM Potassium Level  4.4 mmol/L (3.5 - 5.3)   1/19/2021 4:25 PM Creatinine Level  1.08 mg/dL (0.60 - 1.35)   1/19/2021 4:25 PM AST/SGOT  15 unit/L (10 - 40)   1/19/2021 4:25 PM ALT/SGPT  23 unit/L (9 - 46)   1/19/2021 4:25 PM C-Reactive Protein (CRP) ((H)) 16.9 mg/L ( - <8.0)   1/19/2021 4:25 PM WBC  7.5 (3.8 - 10.8)   1/19/2021 4:25 PM Hgb  16.1 gm/dL (13.2 - 17.1)   1/19/2021 4:25 PM Factor V Leiden Mutation Interp  See commentPatient called returning Justine's call. Given message and transferred to schedule follow up to discuss lab result with hospitals.

## 2022-02-15 NOTE — PROGRESS NOTES
Patient:   ANURAG GARCIA            MRN: [id number]            FIN: [id number]               Age:   40 years     Sex:  Male     :  1980   Associated Diagnoses:   Viral warts   Author:   Isra Andrews PA-C      Visit Information      Date of Service: 2021 02:36 pm  Performing Location: [facility]  Encounter#: [id number]      Chief Complaint   2021 2:40 PM CDT    suture removal        Subjective   Chief complaint 2021 2:40 PM CDT    suture removal  .        Health Status   Allergies:    Allergic Reactions (Selected)  No Known Medication Allergies   Medications:  (Selected)   Prescriptions  Prescribed  Auto-Titrating CPAP 4-16: Auto-Titrating CPAP 4-16, See Instructions, Instructions: Heated humidifier, heated tubing, filters, nasal or full face mask of choice with headgear.  Replacement cushions and supplies as needed.  Months = 99 (Lifetime), Supply, # 1 EA, 0 Refill(s), T...  BuPROPion (Eqv-Wellbutrin SR) 150 mg/12 hours oral tablet, extended release: = 1 tab(s), Oral, bid, # 180 tab(s), 0 Refill(s), Type: Maintenance, Pharmacy: [id number], 1 tab(s) Oral bid,x90 day(s), 71.25, in, 04/15/21 15:54:00 CDT, Height Measured, 227, lb, 04/15/21 15:54:00 CDT, Weight Measured  Xarelto 20 mg oral tablet: = 1 tab(s) ( 20 mg ), Oral, qpm, # 90 tab(s), 1 Refill(s), Type: Maintenance, Pharmacy: [facility] #[id number], 1 tab(s) Oral qpm,x90 day(s), 71.25, in, 04/15/21 15:54:00 CDT, Height Measured, 227, lb, 04/15/21 15:54:00 CDT, Weight Measured  Documented Medications  Documented  Enbrel: ( 25 mg ), Subcutaneous, 2x/wk, 0 Refill(s), Type: Maintenance,    Medications          *denotes recorded medication          Auto-Titrating CPAP 4-16: See Instructions, Heated humidifier, heated tubing, filters, nasal or full face mask of choice with headgear.  Replacement cushions and supplies as needed.  Months = 99 (Lifetime), 1 EA, 0 Refill(s).          BuPROPion  (Eqv-Wellbutrin SR) 150 mg/12 hours oral tablet, extended release: 1 tab(s), Oral, bid, for 90 day(s), 180 tab(s), 0 Refill(s).          *Enbrel: 25 mg, Subcutaneous, 2x/wk, 0 Refill(s).          Xarelto 20 mg oral tablet: 20 mg, 1 tab(s), Oral, qpm, for 90 day(s), 90 tab(s), 1 Refill(s).       Problem list:    All Problems (Selected)  Umbilical hernia / SNOMED CT 2202129836 / Confirmed  JELANI (obstructive sleep apnea) / SNOMED CT 762810161 / Confirmed  Obesity / ICD-9-.00 / Probable  Mechanical Low Back Pain / ICD-9-.2 / Confirmed  History of parotitis / SNOMED CT 9713595396 / Confirmed  History of mononucleosis / SNOMED CT 5479825945 / Confirmed  Anxiety / SNOMED CT 64702566 / Confirmed  Ankylosing spondylitis / SNOMED CT 99900348 / Confirmed      Objective   Vital Signs   9/24/2021 2:40 PM CDT Peripheral Pulse Rate 79 bpm    HR Method Electronic    Systolic Blood Pressure 120 mmHg    Diastolic Blood Pressure 72 mmHg    Mean Arterial Pressure 88 mmHg    BP Site Right arm    BP Method Manual    Oxygen Saturation 99 %      Measurements from flowsheet : Measurements   9/24/2021 2:40 PM CDT Height Measured - Standard 71.25 in    Weight Measured - Standard 228.1 lb    BSA 2.28 m2    Body Mass Index 31.59 kg/m2  HI      Two biopsy sites right ant thigh. Healing well. Dx was verruca. No concerns. Has now noticed 3 similar lesions on left leg. Visually appear the same. Verbal informed consent and treated with Verisol. He will wash off in 5 hours. RTC PRN. Sutures removed. Discussed possible need for retreatment, infection, pain, etc. Right thigh each 5 mm and left thigh one 4 mm and two 3 mm lesions.      Impression and Plan   Assessment and Plan:          Diagnosis: Viral warts (SBS04-BP B07.9).    Orders     FU PRN.     .

## 2022-02-15 NOTE — PROGRESS NOTES
Chief Complaint    Discuss Anxiety  History of Present Illness       Patient is here to discuss anxiety.       He has had some anxiety issues previously.  He attributed to a poor workplace environment.  He has done well over the past year but thinks things have gotten a little worse.  He notices that at work he is a little shorter tempered and a little more irritable.  He is having some marriage difficulties.  He does have ankylosing spondylitis but has not been having back pain and he takes Enbrel with tremendous results.       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  He is hoping to change Eliquis to something once a day  Review of Systems       See HPI.  All other review of systems negative.  Physical Exam   Vitals & Measurements    HR: 72 (Peripheral)  BP: 124/66     HT: 71.25 in  WT: 227 lb  BMI: 31.43        General: Alert and oriented, No acute distress.       Eye: Pupils are equal, round and reactive to light, Normal conjunctiva.       HENT: Oral mucosa is moist.       Neck: Supple.       Respiratory: Respirations are non-labored.       Cardiovascular: Normal rate, Regular rhythm, No edema.       Gastrointestinal: Non-distended.       Musculoskeletal: Normal gait.       Integumentary: Warm, No rash.       Psychiatric: Cooperative, Appropriate mood & affect, Normal judgment  Assessment/Plan       Ankylosing spondylitis (M45.7)          He will be seeing rheumatology and continue on his Enbrel he is aware of the immunosuppression he has received the Covid vaccine         Also we will try a prescription for Xarelto that would be once a day for DVT prevention       Anxiety (F41.1)          Talked about mental health for many facets that are poultice we will restart citalopram       JELANI (obstructive sleep apnea) (G47.33)          He has not been using this regularly and does plan on restarting for the benefit it may give him for  his mental health in general fitness  Patient Information     Name:ANURAG GARCIA      Address:      [address] [place] [id number]     Sex:Male     YOB: 1980     [phone]     Emergency Contact:VIJI GARCIA     MRN:[id number]     FIN:[id number]     Location:[facility]     Date of Service:04/15/2021      Primary Care Physician:       Jose Adams MD, [phone]      Attending Physician:       Jose Adams MD, [phone]  Problem List/Past Medical History    Ongoing     Ankylosing spondylitis     Anxiety     History of mononucleosis     History of parotitis     Mechanical Low Back Pain     Obesity     JELANI (obstructive sleep apnea)     Umbilical hernia    Historical     Inpatient stay       Comments: @[facility] - Tonsillitis, mononucleosis, parotitis  Procedure/Surgical History     Repair of ventral hernia (04/19/2018)     Polysomnogram (08/07/2014)      Comments: AHI of 4.2/hr.  REM AHI of 13.  Medications    Auto-Titrating CPAP 4-16, See Instructions    citalopram 20 mg oral tablet, 20 mg= 1 tab(s), Oral, daily, 3 refills    Eliquis 5 mg oral tablet, 5 mg, Oral, bid, 3 refills    Enbrel, 25 mg, Subcutaneous, 2x/wk    Xarelto 20 mg oral tablet, 20 mg= 1 tab(s), Oral, qpm, 1 refills  Allergies    No Known Medication Allergies  Social History    Smoking Status     Never smoker     Alcohol      Current, Once a week, 2 drinks/episode average.     Electronic Cigarette/Vaping      Electronic Cigarette Use: Never.     Employment/School      Employed, Work/School description: Phy.Ed./Health Teacher.     Exercise      Exercise frequency: 2-3 times/week.     Home/Environment      Marital status:  (Living together).     Tobacco      Never (less than 100 in lifetime)  Family History    Hypercholesterolemia: Mother and Father.  Immunizations      Vaccine Date Status          SARS-CoV-2 (COVID-19) Pfizer-162b2 02/03/2021 Recorded          SARS-CoV-2 (COVID-19)  Pfizer-162b2 01/13/2021 Recorded          influenza virus vaccine, inactivated 11/24/2020 Recorded          influenza virus vaccine, inactivated 10/18/2019 Recorded          influenza virus vaccine, inactivated 10/01/2019 Recorded          tetanus/diphth/pertuss (Tdap) adult/adol 10/10/2018 Given          pneumococcal (PCV13) 10/10/2018 Given          influenza virus vaccine, inactivated 10/02/2018 Given          influenza virus vaccine, inactivated 10/27/2017 Given          influenza virus vaccine, inactivated 12/12/2016 Recorded          influenza 11/18/2016 Recorded          influenza virus vaccine, inactivated 12/02/2015 Given          influenza virus vaccine, inactivated 09/11/2014 Recorded          influenza virus vaccine, inactivated 10/10/2013 Given          influenza virus vaccine, inactivated 09/19/2012 Given          tetanus/diphth/pertuss (Tdap) adult/adol 04/16/2008 Recorded  Lab Results          Lab Results (Last 4 results within 90 days)           Sodium Level: 138 mmol/L [135 mmol/L - 146 mmol/L] (01/19/21 16:25:00)          Potassium Level: 4.4 mmol/L [3.5 mmol/L - 5.3 mmol/L] (01/19/21 16:25:00)          Chloride Level: 105 mmol/L [98 mmol/L - 110 mmol/L] (01/19/21 16:25:00)          CO2 Level: 24 mmol/L [20 mmol/L - 32 mmol/L] (01/19/21 16:25:00)          Glucose Level: 90 mg/dL [65 mg/dL - 99 mg/dL] (01/19/21 16:25:00)          BUN: 19 mg/dL [7 mg/dL - 25 mg/dL] (01/19/21 16:25:00)          Creatinine Level: 1.08 mg/dL [0.6 mg/dL - 1.35 mg/dL] (01/19/21 16:25:00)          BUN/Creat Ratio: NOT APPLICABLE [6  - 22] (01/19/21 16:25:00)          eGFR: 86 mL/min/1.73m2 (01/19/21 16:25:00)          eGFR African American: 100 mL/min/1.73m2 (01/19/21 16:25:00)          Calcium Level: 9.7 mg/dL [8.6 mg/dL - 10.3 mg/dL] (01/19/21 16:25:00)          Bilirubin Total: 0.5 mg/dL [0.2 mg/dL - 1.2 mg/dL] (01/19/21 16:25:00)          Alkaline Phosphatase: 87 unit/L [36 unit/L - 130 unit/L] (01/19/21 16:25:00)           AST/SGOT: 15 unit/L [10 unit/L - 40 unit/L] (01/19/21 16:25:00)          ALT/SGPT: 23 unit/L [9 unit/L - 46 unit/L] (01/19/21 16:25:00)          Protein Total: 7.6 gm/dL [6.1 gm/dL - 8.1 gm/dL] (01/19/21 16:25:00)          Albumin Level: 4.4 gm/dL [3.6 gm/dL - 5.1 gm/dL] (01/19/21 16:25:00)          Globulin: 3.2 [1.9  - 3.7] (01/19/21 16:25:00)          A/G Ratio: 1.4 [1  - 2.5] (01/19/21 16:25:00)          C-Reactive Protein (CRP): 16.9 mg/L High (01/19/21 16:25:00)          WBC: 7.5 [3.8  - 10.8] (01/19/21 16:25:00)          RBC: 5.26 [4.2  - 5.8] (01/19/21 16:25:00)          Hgb: 16.1 gm/dL [13.2 gm/dL - 17.1 gm/dL] (01/19/21 16:25:00)          Hct: 46.1 % [38.5 % - 50 %] (01/19/21 16:25:00)          MCV: 87.6 fL [80 fL - 100 fL] (01/19/21 16:25:00)          MCH: 30.6 pg [27 pg - 33 pg] (01/19/21 16:25:00)          MCHC: 34.9 gm/dL [32 gm/dL - 36 gm/dL] (01/19/21 16:25:00)          RDW: 12.3 % [11 % - 15 %] (01/19/21 16:25:00)          Platelet: 307 [140  - 400] (01/19/21 16:25:00)          MPV: 10.2 fL [7.5 fL - 12.5 fL] (01/19/21 16:25:00)          Lymphocytes: 26.2 % (01/19/21 16:25:00)          Abs Lymphocytes: 1965 [850  - 3900] (01/19/21 16:25:00)          Neutrophils: 63 % (01/19/21 16:25:00)          Abs Neutrophils: 4725 [1500  - 7800] (01/19/21 16:25:00)          Monocytes: 8.9 % (01/19/21 16:25:00)          Abs Monocytes: 668 [200  - 950] (01/19/21 16:25:00)          Eosinophils: 1.5 % (01/19/21 16:25:00)          Abs Eosinophils: 113 [15  - 500] (01/19/21 16:25:00)          Basophils: 0.4 % (01/19/21 16:25:00)          Abs Basophils: 30 [0  - 200] (01/19/21 16:25:00)          Sed Rate: 2 (01/19/21 16:25:00)          PT: 11.2 [9  - 11.5] (01/19/21 16:25:00)          INR: 1.1 (01/19/21 16:25:00)          PTT: 29 [23  - 32] (01/19/21 16:25:00)          UA Color: YELLOW (01/19/21 16:25:00)          UA Appear: TURBID Abnormal (01/19/21 16:25:00)          UA pH: 5.5 [5  - 8] (01/19/21 16:25:00)           UA Specific Gravity: 1.03 [1.001  - 1.035] (01/19/21 16:25:00)          UA Glucose: NEGATIVE [NEGATIVE  - NEGATIVE] (01/19/21 16:25:00)          UA Bilirubin: NEGATIVE [NEGATIVE  - NEGATIVE] (01/19/21 16:25:00)          UA Ketones: NEGATIVE [NEGATIVE  - NEGATIVE] (01/19/21 16:25:00)          UA Blood: NEGATIVE [NEGATIVE  - NEGATIVE] (01/19/21 16:25:00)          UA Protein: NEGATIVE [NEGATIVE  - NEGATIVE] (01/19/21 16:25:00)          UA Nitrite: NEGATIVE [NEGATIVE  - NEGATIVE] (01/19/21 16:25:00)          UA Leukocyte Esterase: NEGATIVE [NEGATIVE  - NEGATIVE] (01/19/21 16:25:00)          dRVVT Scrn: 34 (01/19/21 16:25:00)          Lupus Anticoagulant: See comment (01/19/21 16:25:00)          PTT LA Scrn: 34 (01/19/21 16:25:00)          Beta 2 (B2) Glycoprotein IgA: <9 (01/19/21 16:25:00)          Beta 2 (B2) Glycoprotein IgG: <9 (01/19/21 16:25:00)          Beta 2 (B2) Glycoprotein IgM: <9 (01/19/21 16:25:00)          Cardiolipin IgG Ab: <14 (01/19/21 16:25:00)          Cardiolipin IgM Ab: <12 (01/19/21 16:25:00)          Cardiolipin IgA Ab: <11 (01/19/21 16:25:00)          Cardiolipin Ab Interp: See comment (01/19/21 16:25:00)          Phosphatidylserine Ab IgG: <10 (01/19/21 16:25:00)          Phosphatidylserine Ab IgM: <25 (01/19/21 16:25:00)          Phos Serine Ab IgA: <20 (01/19/21 16:25:00)          Factor V Leiden Mutation: See comment (01/19/21 16:25:00)          Factor V Leiden Mutation Interp: See comment (01/19/21 16:25:00)

## 2022-02-15 NOTE — PROGRESS NOTES
Patient:   ANURAG GARCIA            MRN: [id number]            FIN: [id number]               Age:   37 years     Sex:  Male     :  1980   Associated Diagnoses:   Fever   Author:   Joes Adams MD      Chief Complaint   2018 9:56 AM CST     pt here for complaint of cold symptoms that have been worsening symptoms have gone into chest, he is coughing up green sputum, had a fever at 101 last week, chest congestion        History of Present Illness             The patient presents with cough for several days, now developing low grade fevers and green sputum  no wheezing  no rash.        Review of Systems   Constitutional:  Fever, No chills.    Respiratory:  Shortness of breath, Cough, No wheezing.    Gastrointestinal:  No nausea, No vomiting.    Immunologic:  Malaise.    Integumentary:  No rash.             Health Status   Allergies:    Allergic Reactions (Selected)  No Known Medication Allergies   Medications:  (Selected)   Prescriptions  Prescribed  Azithromycin 5 Day Dose Pack 250 mg oral tablet: See Instructions, Instructions: 2 tabs day 1 and then 1 tab days 2-5, # 6 tab(s), 0 Refill(s), Type: Maintenance, Pharmacy: [facility] #[id number], 2 tabs day 1 and then 1 tab days 2-5  Documented Medications  Documented  Enbrel: ( 25 mg ), Subcutaneous, 2x/wk, 0 Refill(s), Type: Maintenance   Problem list:    All Problems (Selected)  Ankylosing spondylitis / 48585300 / Confirmed  Mechanical Low Back Pain / 724.2 / Confirmed  Obesity / 278.00 / Probable  JELANI (obstructive sleep apnea) / 545657385 / Confirmed  Umbilical hernia / 7753006426 / Confirmed  Anxiety / 21308540 / Confirmed  History of mononucleosis / 4768011570 / Confirmed  History of parotitis / 2406831740 / Confirmed      Histories   Past Medical History:    Active  History of mononucleosis (7886742591): Onset on 2016 at 36 years.  History of parotitis (6355596968): Onset on 2016 at 36 years.  JELANI (obstructive sleep apnea) (337268623):  Onset on 8/7/2014 at 33 years.  Ankylosing spondylitis (46841131): Onset in the month of 10/2006 at 25 years  Mechanical Low Back Pain (724.2)  Resolved  Inpatient stay (135023044): Onset on 12/11/2016 at 36 years.  Resolved on 12/14/2016 at 36 years.  Comments:  12/20/2016 CST 6:42 AM CST - Mile Palomares  @[facility] - Tonsillitis, mononucleosis, parotitis   Family History:    Hypercholesterolemia  Mother  Father     Procedure history:    Polysomnogram (SNOMED CT 622830382) on 8/7/2014 at 33 Years.  Comments:  9/3/2014 1:39 PM - Cortes CMA, Kamla  AHI of 4.2/hr.  REM AHI of 13   Social History:        Alcohol Assessment            Current, Once a week, 2 drinks/episode average.      Tobacco Assessment: Denies Tobacco Use            Never      Employment and Education Assessment            Employed, Work/School description: Phy.Ed./Health Teacher.      Home and Environment Assessment            Marital status:  (Living together).      Exercise and Physical Activity Assessment            Exercise frequency: 2-3 times/week.        Physical Examination   Vital Signs   1/9/2018 9:56 AM CST Temperature Tympanic 97.9 DegF    Peripheral Pulse Rate 98 bpm    Pulse Site Radial artery    Systolic Blood Pressure 122 mmHg    Diastolic Blood Pressure 80 mmHg    Mean Arterial Pressure 94 mmHg    BP Site Right arm    Oxygen Saturation 96 %      Measurements from flowsheet : Measurements   1/9/2018 9:56 AM CST Height Measured - Standard 71.25 in    Weight Measured - Standard 225.8 lb    BSA 2.27 m2    Body Mass Index 31.27 kg/m2  HI      General:  Alert and oriented, No acute distress.    Neck:  Supple.    Respiratory:  Respirations are non-labored, bilateral rhonchi.    Cardiovascular:  Regular rhythm.    Integumentary:  Warm.    Psychiatric:  Cooperative, Appropriate mood & affect.       Impression and Plan   Diagnosis     Fever ([id number] R50.9).     Orders     Orders (Selected)   Prescriptions  Prescribed  Azithromycin 5 Day  Dose Pack 250 mg oral tablet: See Instructions, Instructions: 2 tabs day 1 and then 1 tab days 2-5, # 6 tab(s), 0 Refill(s), Type: Maintenance, Pharmacy: [facility] #[id number], 2 tabs day 1 and then 1 tab days 2-5.     patient with cough which may be viral however he is on immunosuppresants so will cover with yvette linares.

## 2022-02-15 NOTE — NURSING NOTE
Comprehensive Intake Entered On:  5/13/2019 3:09 PM CDT    Performed On:  5/13/2019 3:06 PM CDT by Ivana Marquez CMA               Summary   Chief Complaint :   c/o cough x 3 days   Weight Measured :   231.2 lb(Converted to: 231 lb 3 oz, 104.87 kg)    Height Measured :   71.25 in(Converted to: 5 ft 11 in, 180.97 cm)    Body Mass Index :   32.02 kg/m2 (HI)    Body Surface Area :   2.29 m2   Systolic Blood Pressure :   124 mmHg   Diastolic Blood Pressure :   72 mmHg   Mean Arterial Pressure :   89 mmHg   Peripheral Pulse Rate :   72 bpm   Temperature Tympanic :   98.6 DegF(Converted to: 37.0 DegC)    Ivana Marquez CMA - 5/13/2019 3:06 PM CDT   Health Status   Allergies Verified? :   Yes   Medication History Verified? :   Yes   Pre-Visit Planning Status :   Completed   Tobacco Use? :   Never smoker   Ivana Marquez CMA - 5/13/2019 3:06 PM CDT   Consents   Consent for Immunization Exchange :   Consent Granted   Consent for Immunizations to Providers :   Consent Granted   Ivana Marquez CMA - 5/13/2019 3:06 PM CDT   Meds / Allergies   (As Of: 5/13/2019 3:09:52 PM CDT)   Allergies (Active)   No Known Medication Allergies  Estimated Onset Date:   Unspecified ; Created By:   Carson Kramer CMA; Reaction Status:   Active ; Category:   Drug ; Substance:   No Known Medication Allergies ; Type:   Allergy ; Updated By:   Carson Kramer CMA; Reviewed Date:   3/14/2018 3:34 PM CDT        Medication List   (As Of: 5/13/2019 3:09:52 PM CDT)   Prescription/Discharge Order    Miscellaneous Rx Supply  :   Miscellaneous Rx Supply ; Status:   Prescribed ; Ordered As Mnemonic:   Auto-Titrating CPAP 4-16 ; Simple Display Line:   See Instructions, Heated humidifier, heated tubing, filters, nasal or full face mask of choice with headgear.  Replacement cushions and supplies as needed.  Months = 99 (Lifetime), 1 EA, 0 Refill(s) ; Ordering Provider:   Jose Adams MD; Catalog Code:   Miscellaneous Rx Supply  ; Order Dt/Tm:   3/13/2018 4:50:29 PM            Home Meds    etanercept  :   etanercept ; Status:   Documented ; Ordered As Mnemonic:   Enbrel ; Simple Display Line:   25 mg, Subcutaneous, 2x/wk, 0 Refill(s) ; Catalog Code:   etanercept ; Order Dt/Tm:   5/11/2010 5:42:16 PM

## 2022-02-15 NOTE — PROGRESS NOTES
Patient:   ANURAG GARCIA            MRN: [id number]            FIN: [id number]               Age:   40 years     Sex:  Male     :  1980   Associated Diagnoses:   Left leg DVT   Author:   Nael Chambers MD      Visit Information      Date of Service: 2021 03:19 pm  Performing Location: [facility]  Encounter#: [id number]      Primary Care Provider (PCP):  Jose Adams MD    NPI# [id number]      Referring Provider:  Nael Chambers MD    NPI# [id number]      Chief Complaint   2021 3:26 PM CST    Left leg pain, tingling x10 days.  History of blood clots.      History of Present Illness   Patient is here for leg pain he notes for the last 10 days he has had left leg pain.  Is been posterior pain with some tingling.  It is always been below the knee.  No injuries.  History of a below the knee DVT last year treated with Eliquis for 3 months came off it in early fall.  No family history of blood clots no injuries no recent travels no chest pain shortness of breath or cough         Review of Systems   Constitutional:  Negative.    Eye:  Negative.    Ear/Nose/Mouth/Throat:  Negative.    Respiratory:  Negative.    Cardiovascular:  Negative.    Gastrointestinal:  Negative.    Genitourinary:  Negative.    Hematology/Lymphatics:  Negative.    Endocrine:  Negative.    Immunologic:  Negative.    Musculoskeletal:  Negative except as documented in history of present illness.    Integumentary:  Negative.    Neurologic:  Negative.       Health Status   Allergies:    Allergic Reactions (Selected)  No Known Medication Allergies   Medications:  (Selected)   Prescriptions  Prescribed  Auto-Titrating CPAP 4-16: Auto-Titrating CPAP 4-16, See Instructions, Instructions: Heated humidifier, heated tubing, filters, nasal or full face mask of choice with headgear.  Replacement cushions and supplies as needed.  Months = 99 (Lifetime), Supply, # 1 EA, 0 Refill(s), T...  Documented  Medications  Documented  Enbrel: ( 25 mg ), Subcutaneous, 2x/wk, 0 Refill(s), Type: Maintenance   Problem list:    All Problems (Selected)  Ankylosing spondylitis / SNOMED CT 22290776 / Confirmed  Anxiety / SNOMED CT 91272291 / Confirmed  History of mononucleosis / SNOMED CT 2713027519 / Confirmed  History of parotitis / SNOMED CT 0763751292 / Confirmed  Mechanical Low Back Pain / ICD-9-.2 / Confirmed  Obesity / ICD-9-.00 / Probable  JELANI (obstructive sleep apnea) / SNOMED CT 672837334 / Confirmed  Umbilical hernia / SNOMED CT 3351931877 / Confirmed      Histories   Past Medical History:    Active  History of mononucleosis (6936707643): Onset on 12/11/2016 at 36 years.  History of parotitis (1880849826): Onset on 12/11/2016 at 36 years.  JELANI (obstructive sleep apnea) (283212055): Onset on 8/7/2014 at 33 years.  Ankylosing spondylitis (80552582): Onset in the month of 10/2006 at 25 years  Mechanical Low Back Pain (724.2)  Resolved  Inpatient stay (676790321): Onset on 12/11/2016 at 36 years.  Resolved on 12/14/2016 at 36 years.  Comments:  12/20/2016 CST 6:42 AM CST - Mile Palomares  @[facility] - Tonsillitis, mononucleosis, parotitis   Family History:    Hypercholesterolemia  Mother  Father     Procedure history:    Repair of ventral hernia (SNOMED CT 052247786) performed by Aren Thomas MD on 4/19/2018 at 37 Years.  Polysomnogram (SNOMED CT 877042072) on 8/7/2014 at 33 Years.  Comments:  9/3/2014 1:39 PM CDT - Kamla Cortes CMA  AHI of 4.2/hr.  REM AHI of 13   Social History:        Electronic Cigarette/Vaping Assessment            Electronic Cigarette Use: Never.      Alcohol Assessment            Current, Once a week, 2 drinks/episode average.      Tobacco Assessment            Never (less than 100 in lifetime)      Employment and Education Assessment            Employed, Work/School description: Phy.Ed./Health Teacher.      Home and Environment Assessment            Marital status:  (Living  together).      Exercise and Physical Activity Assessment            Exercise frequency: 2-3 times/week.        Physical Examination   Measurements from flowsheet : Measurements   1/19/2021 3:26 PM CST    Weight Measured - Standard                225.6 lb     General:  Alert and oriented, No acute distress.    Neck:  Supple.    Respiratory:  Respirations are non-labored.    Cardiovascular:  Normal rate, Regular rhythm, No edema.    Musculoskeletal:  He has posterior calf tenderness at mid calf.  Is a positive Filiberto.  No swelling no redness no increased warmth CMS intact  .    Neurologic:  Alert, Oriented.       Impression and Plan   Diagnosis     Left leg DVT (WQF58-GL I82.402).     Course:  Not progressing as expected.    Plan:  Patient with second episode of below the knee DVT unprovoked.  We will check labs initially for hypercoagulable issues.  We will start him on Eliquis as has been on before.  Warned of side effects warned of bleeding risk no nonsteroidals.  See Dr. Adams back in a month.  Call for any worsening symptoms chest pain or shortness of breath.  .    Patient Instructions:       Counseled: Patient, Regarding diagnosis, Regarding treatment, Regarding medications, Activity.

## 2022-02-15 NOTE — NURSING NOTE
Comprehensive Intake Entered On:  1/27/2021 7:10 AM CST    Performed On:  1/27/2021 7:06 AM CST by Luz GARCIA, Telma               Summary   Chief Complaint :   f/u labs.   Weight Measured :   224.2 lb(Converted to: 224 lb 3 oz, 101.695 kg)    Height Measured :   71.25 in(Converted to: 5 ft 11 in, 180.97 cm)    Body Mass Index :   31.05 kg/m2 (HI)    Body Surface Area :   2.26 m2   Systolic Blood Pressure :   102 mmHg   Diastolic Blood Pressure :   80 mmHg   Mean Arterial Pressure :   87 mmHg   Peripheral Pulse Rate :   86 bpm   BP Site :   Right arm   Pulse Site :   Radial artery   BP Method :   Manual   HR Method :   Manual   Temperature Tympanic :   96.9 DegF(Converted to: 36.1 DegC)  (LOW)    Oxygen Saturation :   97 %   Telma Escobar MA - 1/27/2021 7:06 AM CST   Health Status   Allergies Verified? :   Yes   Medication History Verified? :   Yes   Medical History Verified? :   Yes   Pre-Visit Planning Status :   Completed   Tobacco Use? :   Never smoker   Telma Escobar MA - 1/27/2021 7:06 AM CST   Consents   Consent for Immunization Exchange :   Consent Granted   Consent for Immunizations to Providers :   Consent Granted   Telma Escobar MA - 1/27/2021 7:06 AM CST   Meds / Allergies   (As Of: 1/27/2021 7:10:24 AM CST)   Allergies (Active)   No Known Medication Allergies  Estimated Onset Date:   Unspecified ; Created By:   Carson Kramer CMA; Reaction Status:   Active ; Category:   Drug ; Substance:   No Known Medication Allergies ; Type:   Allergy ; Updated By:   Carson Kramer CMA; Reviewed Date:   1/19/2021 3:40 PM CST        Medication List   (As Of: 1/27/2021 7:10:24 AM CST)   Prescription/Discharge Order    apixaban  :   apixaban ; Status:   Prescribed ; Ordered As Mnemonic:   Eliquis 5 mg oral tablet ; Simple Display Line:   5 mg, Oral, bid, 60 tab(s), 3 Refill(s) ; Ordering Provider:   Nael Chambers MD; Catalog Code:   apixaban ; Order Dt/Tm:   1/19/2021 4:17:25 PM CST           Miscellaneous Rx Supply  :   Miscellaneous Rx Supply ; Status:   Prescribed ; Ordered As Mnemonic:   Auto-Titrating CPAP 4-16 ; Simple Display Line:   See Instructions, Heated humidifier, heated tubing, filters, nasal or full face mask of choice with headgear.  Replacement cushions and supplies as needed.  Months = 99 (Lifetime), 1 EA, 0 Refill(s) ; Ordering Provider:   Jose Adams MD; Catalog Code:   Miscellaneous Rx Supply ; Order Dt/Tm:   3/13/2018 4:50:29 PM CDT            Home Meds    etanercept  :   etanercept ; Status:   Documented ; Ordered As Mnemonic:   Enbrel ; Simple Display Line:   25 mg, Subcutaneous, 2x/wk, 0 Refill(s) ; Catalog Code:   etanercept ; Order Dt/Tm:   5/11/2010 5:42:16 PM CDT            ID Risk Screen   Recent Travel History :   No recent travel   Family Member Travel History :   No recent travel   Other Exposure to Infectious Disease :   Unknown   COVID-19 Testing Status :   No COVID-19 test performed   Telma Escobar MA - 1/27/2021 7:06 AM CST   Social History   Social History   (As Of: 1/27/2021 7:10:24 AM CST)   Alcohol:        Current, Once a week, 2 drinks/episode average.   (Last Updated: 11/8/2011 10:04:01 AM CST by Telma Marshall)          Tobacco:        Never (less than 100 in lifetime)   (Last Updated: 1/19/2021 3:27:23 PM CST by Carolynn Hall)          Electronic Cigarette/Vaping:        Electronic Cigarette Use: Never.   (Last Updated: 1/19/2021 3:27:27 PM CST by Carolynn Hall)          Employment/School:        Employed, Work/School description: Phy.Ed./Health Teacher.   (Last Updated: 11/8/2011 10:01:49 AM CST by Telma Marshall)          Home/Environment:        Marital status:  (Living together).   (Last Updated: 11/8/2011 10:16:33 AM CST by Telma Marshall)          Exercise:        Exercise frequency: 2-3 times/week.   (Last Updated: 11/8/2011 10:05:42 AM CST by Telma Marshall)

## 2022-02-15 NOTE — TELEPHONE ENCOUNTER
---------------------  From: Homa Pereira CMA ([facility] Pool ([id number]))   To: JEAN MARIE Delaney Pool ([id number] [facility] [place]);     Sent: 8/5/2021 7:19:15 AM CDT  Subject: FW: Medication Management   Due Date/Time: 8/5/2021 3:46:00 AM CDT       no RTC - please advise      ------------------------------------------  From: [employer] #[id number]  To: Jose Adams MD  Sent: August 4, 2021 3:46:37 AM CDT  Subject: Medication Management  Due: August 3, 2021 11:17:39 AM CDT     ** On Hold Pending Signature **     Dispensed Drug: buPROPion (BuPROPion (Eqv-Wellbutrin SR) 150 mg/12 hours oral tablet, extended release), TAKE 1 TABLET BY MOUTH TWICE DAILY  Quantity: 60 tab(s)  Days Supply: 30  Refills: 0  Substitutions Allowed  Notes from Pharmacy:  ---------------------------------------------------------------  From: Familia Painter LPN (JEAN MARIE Delaney RADSONE ([id number]))   To: Jose Adams MD;     Sent: 8/5/2021 8:39:37 AM CDT  Subject: FW: Medication Management   Due Date/Time: 8/5/2021 3:46:00 AM CDT---------------------  From: Jose Adams MD   To: [facility] Pool ([id number] [facility] [place]);     Sent: 8/5/2021 10:11:58 AM CDT  Subject: RE: Medication Management     ok for refill

## 2022-02-15 NOTE — TELEPHONE ENCOUNTER
---------------------  From: Johanna Jane RN (Phone Messages Pool ([address] [place] [address]))   To: [facility] Message Pool ([address] [place] [address]);     Sent: 9/3/2021 10:04:54 AM CDT  Subject: COVID Booster and flu shot       PCP:   JEAN MARIE      Time of Call:  10:00       Person Calling:  Pt  Phone number:  [phone]    Note:   Pt calling stating he is immunocompromised and would like to see if he is qualified to get the COVID booster shot.  He would also like to get scheduled for flu shot at the same time if qualified.  Pt informed that JEAN MARIE is out of clinic until Tuesday September 7th and was okay with waiting.  Please contact pt either way. Thank you!    Last office visit and reason:  4/15/21,  Anxiety---------------------  From: Marti Rg CMA ([facility] Message Pool ([address] [place] [address]))   To: Jose Adams MD;     Sent: 9/3/2021 4:45:57 PM CDT  Subject: FW: COVID Booster and flu shot---------------------  From: Jose Adams MD   To: [facility] Message Pool ([address] [place] [address]);     Sent: 9/4/2021 2:49:04 PM CDT  Subject: RE: COVID Booster and flu shot     he qualifies for booster since he is on enbrel---------------------  From: Flori Courtney CMA ([facility] Message Pool ([address] [place] [address]))   To: [facility] Pool ([address] [place] [address]);     Sent: 9/8/2021 8:28:44 AM CDT  Subject: FW: COVID Booster and flu shotmerlyn has pfizer vaccine - calling diff location

## 2022-02-15 NOTE — PROGRESS NOTES
Chief Complaint    c/o cough x 3 days  History of Present Illness      cough for several days.  kept him awake last night.  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  All other review of systems negative.  Physical Exam   Vitals & Measurements    T: 98.6   F (Tympanic)  HR: 72(Peripheral)  BP: 124/72     HT: 71.25 in  WT: 231.2 lb  BMI: 32.02       General: Alert and oriented, No acute distress.      Eye: Pupils are equal, round and reactive to light, Normal conjunctiva.      HENT: Tympanic membranes are clear, Oral mucosa is moist, No pharyngeal erythema.      Neck: Supple.      Respiratory:      Respirations: Are within normal limits.      Breath sounds: No wheezes present.      Cough: Barking, Productive.      Cardiovascular: Normal rate, Regular rhythm, No edema.      Gastrointestinal: Non-distended.      Musculoskeletal: Normal gait.      Integumentary: Warm, No rash.      Psychiatric: Cooperative, Appropriate mood & affect, Normal judgment.  Assessment/Plan       1. Ankylosing spondylitis (M45.7), Ankylosing spondylitis (M45.7)         I have ordered a CBC per his rheumatologist.  He is doing well on his Enbrel                2. JELANI (obstructive sleep apnea) (G47.33)         Encouraged him to use his CPAP and explained he can use it even with an illness                3. Cough (R05)         Because of being on Enbrel will use azithromycin         Ordered:          azithromycin, See Instructions, Instructions: 2 tabs day 1 and then 1 tab days 2-5, # 6 tab(s), 0 Refill(s), Type: Acute, Pharmacy: [id number], 2 tabs day 1 and then 1 tab days 2-5, (Ordered)                Orders:         CBC (includes diff/plt)* ([facility]), Specimen Type: Blood, Collection Date: 05/13/19 15:19:00 CDT  Patient Information     Name:ANURAG GARCIA       Address:      [address] [place] [id number]     Sex:Male     YOB: 1980     [phone]     Emergency Contact:VIJI GARCIA     MRN:[id number]     FIN:[id number]     Location:[facility]     Date of Service:05/13/2019      Primary Care Physician:       Jose Adams MD, [phone]      Attending Physician:       Jose Adams MD, [phone]  Problem List/Past Medical History    Ongoing     Ankylosing spondylitis     Anxiety     History of mononucleosis     History of parotitis     Mechanical Low Back Pain     Obesity     JELANI (obstructive sleep apnea)     Umbilical hernia    Historical     Inpatient stay       Comments: @[facility] - Tonsillitis, mononucleosis, parotitis  Procedure/Surgical History     Repair of ventral hernia (04/19/2018)     Polysomnogram (08/07/2014)      Comments: AHI of 4.2/hr.  REM AHI of 13.  Medications        Enbrel: 25 mg, Subcutaneous, 2x/wk, 0 Refill(s).        Auto-Titrating CPAP 4-16: See Instructions, Heated humidifier, heated tubing, filters, nasal or full face mask of choice with headgear.  Replacement cushions and supplies as needed.  Months = 99 (Lifetime), 1 EA, 0 Refill(s).        Azithromycin 5 Day Dose Pack 250 mg oral tablet: See Instructions, 2 tabs day 1 and then 1 tab days 2-5, 6 tab(s), 0 Refill(s).         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  Immunizations       Vaccine Date Status      tetanus/diphth/pertuss (Tdap) adult/adol 10/10/2018 Given      pneumococcal (PCV13) 10/10/2018 Given      influenza virus vaccine, inactivated 10/02/2018 Given      influenza virus vaccine, inactivated 10/27/2017 Given      influenza 11/18/2016 Recorded      influenza virus vaccine, inactivated 12/02/2015 Given      influenza virus vaccine, inactivated 10/10/2013 Given      influenza virus vaccine, inactivated 09/19/2012 Given      tetanus/diphth/pertuss (Tdap) adult/adol 04/16/2008 Recorded

## 2022-02-15 NOTE — TELEPHONE ENCOUNTER
---------------------  From: Jose Adams MD   To: ANURAG GARCIA    Sent: 2/28/2020 7:13:55 PM CST  Subject: Patient Message - Results Notification        Your blood tests are looking good.  Your blood sugar is a little elevated and should be rechecked in 1 year    Results:  Date Result Name Ind Value Ref Range   2/27/2020 1:50 PM Sodium Level  137 mmol/L (135 - 146)   2/27/2020 1:50 PM Potassium Level  4.2 mmol/L (3.5 - 5.3)   2/27/2020 1:50 PM Chloride Level  102 mmol/L (98 - 110)   2/27/2020 1:50 PM CO2 Level  28 mmol/L (20 - 32)   2/27/2020 1:50 PM Glucose Level ((H)) 113 mg/dL (65 - 99)   2/27/2020 1:50 PM BUN  13 mg/dL (7 - 25)   2/27/2020 1:50 PM Creatinine Level  1.00 mg/dL (0.60 - 1.35)   2/27/2020 1:50 PM BUN/Creat Ratio  NOT APPLICABLE (6 - 22)   2/27/2020 1:50 PM eGFR  95 mL/min/1.73m2 (> OR = 60 - )   2/27/2020 1:50 PM eGFR African American  110 mL/min/1.73m2 (> OR = 60 - )   2/27/2020 1:50 PM Calcium Level  9.5 mg/dL (8.6 - 10.3)   2/27/2020 1:50 PM Bilirubin Total  0.5 mg/dL (0.2 - 1.2)   2/27/2020 1:50 PM Bilirubin Direct  0.1 mg/dL ( - < OR = 0.2)   2/27/2020 1:50 PM Bilirubin Indirect  0.4 (0.2 - 1.2)   2/27/2020 1:50 PM Alkaline Phosphatase  76 unit/L (36 - 130)   2/27/2020 1:50 PM AST/SGOT  19 unit/L (10 - 40)   2/27/2020 1:50 PM ALT/SGPT  21 unit/L (9 - 46)   2/27/2020 1:50 PM Protein Total  7.0 gm/dL (6.1 - 8.1)   2/27/2020 1:50 PM Albumin Level  4.3 gm/dL (3.6 - 5.1)   2/27/2020 1:50 PM Globulin  2.7 (1.9 - 3.7)   2/27/2020 1:50 PM A/G Ratio  1.6 (1.0 - 2.5)   2/27/2020 1:50 PM LDL Direct ((H)) 103 mg/dL ( - <100)   2/27/2020 1:50 PM TSH  1.41 mIU/L (0.40 - 4.50)   2/27/2020 1:50 PM WBC  6.6 (3.8 - 10.8)   2/27/2020 1:50 PM RBC  5.31 (4.20 - 5.80)   2/27/2020 1:50 PM Hgb  16.4 gm/dL (13.2 - 17.1)   2/27/2020 1:50 PM Hct  46.1 % (38.5 - 50.0)   2/27/2020 1:50 PM MCV  86.8 fL (80.0 - 100.0)   2/27/2020 1:50 PM MCH  30.9 pg (27.0 - 33.0)   2/27/2020 1:50 PM MCHC  35.6 gm/dL (32.0 - 36.0)    2/27/2020 1:50 PM RDW  12.4 % (11.0 - 15.0)   2/27/2020 1:50 PM Platelet  322 (140 - 400)   2/27/2020 1:50 PM MPV  10.5 fL (7.5 - 12.5)   2/27/2020 1:50 PM Sed Rate  2 ( - < OR = 15)

## 2022-02-15 NOTE — NURSING NOTE
Comprehensive Intake Entered On:  11/29/2021 3:50 PM CST    Performed On:  11/29/2021 3:45 PM CST by Brittaney Vera               Summary   Chief Complaint :   Pt here for med check. He is also c/o R shoulder making crackling sounds and weakness. He was putting shelves up this past weekend.    Weight Measured :   238.3 lb(Converted to: 238 lb 5 oz, 108.091 kg)    Height Measured :   71.25 in(Converted to: 5 ft 11 in, 180.97 cm)    Body Mass Index :   33 kg/m2 (HI)    Body Surface Area :   2.33 m2   Systolic Blood Pressure :   124 mmHg   Diastolic Blood Pressure :   80 mmHg   Mean Arterial Pressure :   95 mmHg   Peripheral Pulse Rate :   98 bpm   BP Site :   Right arm   BP Method :   Manual   Respiratory Rate :   16 br/min   Oxygen Saturation :   96 %   Brittaney Vera - 11/29/2021 3:45 PM CST   Health Status   Allergies Verified? :   Yes   Medication History Verified? :   Yes   Tobacco Use? :   Never smoker   Brittaney Vera - 11/29/2021 3:45 PM CST   Consents   Consent for Immunization Exchange :   Consent Granted   Consent for Immunizations to Providers :   Consent Granted   Brittaney Vera - 11/29/2021 3:45 PM CST   Meds / Allergies   (As Of: 11/29/2021 3:50:41 PM CST)   Allergies (Active)   No Known Medication Allergies  Estimated Onset Date:   Unspecified ; Created By:   Carson Kramer CMA; Reaction Status:   Active ; Category:   Drug ; Substance:   No Known Medication Allergies ; Type:   Allergy ; Updated By:   Carson Kramer CMA; Reviewed Date:   11/29/2021 3:49 PM CST        Medication List   (As Of: 11/29/2021 3:50:41 PM CST)   Prescription/Discharge Order    buPROPion  :   buPROPion ; Status:   Prescribed ; Ordered As Mnemonic:   BuPROPion (Eqv-Wellbutrin SR) 150 mg/12 hours oral tablet, extended release ; Simple Display Line:   1 tab(s), Oral, bid, for 90 day(s), 180 tab(s), 0 Refill(s) ; Ordering Provider:   Jose Adams MD; Catalog Code:   buPROPion ; Order Dt/Tm:   8/27/2021 2:42:01 PM  CDT          Miscellaneous Rx Supply  :   Miscellaneous Rx Supply ; Status:   Prescribed ; Ordered As Mnemonic:   Auto-Titrating CPAP 4-16 ; Simple Display Line:   See Instructions, Heated humidifier, heated tubing, filters, nasal or full face mask of choice with headgear.  Replacement cushions and supplies as needed.  Months = 99 (Lifetime), 1 EA, 0 Refill(s) ; Ordering Provider:   Jose Adams MD; Catalog Code:   Miscellaneous Rx Supply ; Order Dt/Tm:   3/13/2018 4:50:29 PM CDT          rivaroxaban  :   rivaroxaban ; Status:   Prescribed ; Ordered As Mnemonic:   Xarelto 20 mg oral tablet ; Simple Display Line:   1 tab(s), Oral, qpm, 90 tab(s), 0 Refill(s) ; Ordering Provider:   Jose Adams MD; Catalog Code:   rivaroxaban ; Order Dt/Tm:   10/19/2021 1:07:12 PM CDT            Home Meds    etanercept  :   etanercept ; Status:   Documented ; Ordered As Mnemonic:   Enbrel ; Simple Display Line:   25 mg, Subcutaneous, 2x/wk, 0 Refill(s) ; Catalog Code:   etanercept ; Order Dt/Tm:   5/11/2010 5:42:16 PM CDT            Social History   Social History   (As Of: 11/29/2021 3:50:41 PM CST)   Alcohol:        Current, Once a week, 2 drinks/episode average.   (Last Updated: 11/8/2011 10:04:01 AM CST by Telma Marshall)          Tobacco:        Never (less than 100 in lifetime)   (Last Updated: 1/19/2021 3:27:23 PM CST by Carolynn Hall)          Electronic Cigarette/Vaping:        Electronic Cigarette Use: Never.   (Last Updated: 1/19/2021 3:27:27 PM CST by Carolynn Hall)          Employment/School:        Employed, Work/School description: Phy.Ed./Health Teacher.   (Last Updated: 11/8/2011 10:01:49 AM CST by Telma Marshall)          Home/Environment:        Marital status:  (Living together).   (Last Updated: 11/8/2011 10:16:33 AM CST by Telma Marshall)          Exercise:        Exercise frequency: 2-3 times/week.   (Last Updated: 11/8/2011 10:05:42 AM CST by Telma Marshall)

## 2022-02-15 NOTE — PROGRESS NOTES
Patient:   ANURAG GACRIA            MRN: [id number]            FIN: [id number]               Age:   40 years     Sex:  Male     :  1980   Associated Diagnoses:   Skin lesions   Author:   Isra Andrews PA-C      Procedure   Dermatology Surgical Procedure   Date/ Time:  2021 8:49:00 AM.     Confirmed: patient, procedure, side, and site are correct, safety procedures followed.     Informed consent: signed by patient.     Indication: rule out cancer.     Procedure performed: Punch biopsy.     Punch biopsy: site: right thigh, sterile preparation of site (sterile preparation of site in usual fashion, sterile preparation of site with 70 % alcohol), Anesthesia (1% xylocaine, without epinephrine), Technique (2  mm, skin held taught), 2  lesions biopsied, specimen obtained and placed in preservative, Single figure of eight stitch to each site with 3-0 Ethilon, Two raised erythematous lesions with scales. One is 3 x 4 other is 4x5 mm. No history of skin cancer. Fair complexion. On Xarelto. .     Complications: none.     Procedure tolerated: well.     Total procedure time: 5  minutes.        Impression and Plan   Diagnosis     Skin lesions ([id number] L98.9).     Wound care discussed. No active bleeding. SR in ten days. Path pending.

## 2022-02-15 NOTE — PROGRESS NOTES
Chief Complaint    f/u blood clot. Not feeling great  History of Present Illness      39-year-old here to discuss his recent blood clot.       Patient was seen because of acute left lower leg pain was found to have a DVT and was put on Eliquis on 23 February.  Ultrasound was negative for any femoral or popliteal DVT.  DVT was in the posterior tibial veins in the mid to distal calf.       She really made him think about his health in general.  He has ankylosing spondylitis and is active with his care at [facility].  He is on Enbrel which is helped tremendously he feels like he is had minimal loss of function but admits he is not as active as he should be.  He is not treating his sleep apnea.  He was mild and just said he could not get used to the mask so we to stop using it.  He does admit to quite a bit of fatigue and daytime sleepiness though.       He is not having any cough or shortness of breath that is new.  He has had some occasional pains down the right long for many months.  Review of Systems      See HPI.  All other review of systems negative.  Physical Exam   Vitals & Measurements    HR: 62(Peripheral)  BP: 116/60     HT: 71.25 in  WT: 227.0 lb  BMI: 31.43       General: Alert and oriented, No acute distress.      Eye: Pupils are equal, round and reactive to light, Normal conjunctiva.      HENT: Oral mucosa is moist.      Neck: Supple.      Respiratory: Respirations are non-labored.      Cardiovascular: Normal rate, Regular rhythm, No edema.      Gastrointestinal: Non-distended.      Musculoskeletal: Normal gait.  Some swelling of the left calf      Integumentary: Warm, No rash.      Psychiatric: Cooperative, Appropriate mood & affect, Normal judgment  Assessment/Plan       1. DVT (deep venous thrombosis) (I82.409)         Discussed the need to wear compression socks to reduce the swelling.  Talked about testing after he was off his medication in 3 to 6 months.  This was an unprovoked DVT.  Will consider  repeating an ultrasound of the leg in 2 to 4 weeks                2. Ankylosing spondylitis (M45.7)         He will continue his care at [facility] but I did remind him of the importance of stretching and physical activity         Ordered:          Basic Metabolic Panel* ([facility]), Specimen Type: Serum, Collection Date: 02/27/20 13:27:00 CST          CBC (h/h, RBC, indices, WBC, Plt)* ([facility]), Specimen Type: Blood, Collection Date: 02/27/20 13:27:00 CST          Direct LDL* ([facility]), Specimen Type: Serum, Collection Date: 02/27/20 13:27:00 CST          Hepatic Function Panel* ([facility]), Specimen Type: Serum, Collection Date: 02/27/20 13:27:00 CST          Sed rate by modified westergren* ([facility]), Specimen Type: Blood, Collection Date: 02/27/20 13:27:00 CST          TSH* ([facility]), Specimen Type: Serum, Collection Date: 02/27/20 13:27:00 CST                3. Fatigue (R53.83)         Talked about doing general testing in the possibility that his sleep apnea was contributing to this.  Also sedentary lifestyle can certainly lead to some malaise         Ordered:          Basic Metabolic Panel* ([facility]), Specimen Type: Serum, Collection Date: 02/27/20 13:27:00 CST          CBC (h/h, RBC, indices, WBC, Plt)* ([facility]), Specimen Type: Blood, Collection Date: 02/27/20 13:27:00 CST          Direct LDL* ([facility]), Specimen Type: Serum, Collection Date: 02/27/20 13:27:00 CST          Hepatic Function Panel* ([facility]), Specimen Type: Serum, Collection Date: 02/27/20 13:27:00 CST          Sed rate by modified westergren* ([facility]), Specimen Type: Blood, Collection Date: 02/27/20 13:27:00 CST          TSH* ([facility]), Specimen Type: Serum, Collection Date: 02/27/20 13:27:00 CST                4. JELANI (obstructive sleep apnea) (G47.33)         We willing to have him meet with DME to consider a different mask to see if possible he could tolerate         Ordered:          Basic Metabolic Panel* ([facility]), Specimen Type: Serum, Collection Date: 02/27/20  13:27:00 CST          CBC (h/h, RBC, indices, WBC, Plt)* ([facility]), Specimen Type: Blood, Collection Date: 02/27/20 13:27:00 CST          Direct LDL* ([facility]), Specimen Type: Serum, Collection Date: 02/27/20 13:27:00 CST          Hepatic Function Panel* ([facility]), Specimen Type: Serum, Collection Date: 02/27/20 13:27:00 CST          Sed rate by modified westergren* ([facility]), Specimen Type: Blood, Collection Date: 02/27/20 13:27:00 CST          TSH* ([facility]), Specimen Type: Serum, Collection Date: 02/27/20 13:27:00 CST           Patient Information     Name:ANURAG GARCIA      Address:      [address] [place] [phone]     Sex:Male     YOB: 1980     [phone]     Emergency Contact:VIJI GARCIA     MRN:[id number]     FIN:[id number]     Location:[facility]     Date of Service:02/27/2020      Primary Care Physician:       Jose Adams MD, [phone]      Attending Physician:       Jose Adams MD, [phone]  Problem List/Past Medical History    Ongoing     Ankylosing spondylitis     Anxiety     History of mononucleosis     History of parotitis     Mechanical Low Back Pain     Obesity     JELANI (obstructive sleep apnea)     Umbilical hernia    Historical     Inpatient stay       Comments: @[facility] - Tonsillitis, mononucleosis, parotitis  Procedure/Surgical History     Repair of ventral hernia (04/19/2018)     Polysomnogram (08/07/2014)      Comments: AHI of 4.2/hr.  REM AHI of 13.  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  Immunizations      Vaccine Date Status          influenza virus vaccine, inactivated 10/01/2019 Recorded          tetanus/diphth/pertuss (Tdap) adult/adol 10/10/2018 Given          pneumococcal (PCV13) 10/10/2018 Given          influenza virus vaccine, inactivated 10/02/2018 Given          influenza virus vaccine, inactivated 10/27/2017 Given          influenza 11/18/2016 Recorded          influenza virus vaccine, inactivated 12/02/2015 Given          influenza virus vaccine, inactivated 10/10/2013 Given          influenza virus vaccine, inactivated 09/19/2012 Given          tetanus/diphth/pertuss (Tdap) adult/adol 04/16/2008 Recorded  Lab Results          Lab Results (Last 4 results within 90 days)           Sodium Level: 137 mmol/L [135 mmol/L - 146 mmol/L] (02/27/20 13:50:00)          Potassium Level: 4.2 mmol/L [3.5 mmol/L - 5.3 mmol/L] (02/27/20 13:50:00)          Chloride Level: 102 mmol/L [98 mmol/L - 110 mmol/L] (02/27/20 13:50:00)          CO2 Level: 28 mmol/L [20 mmol/L - 32 mmol/L] (02/27/20 13:50:00)          Glucose Level: 113 mg/dL High [65 mg/dL - 99 mg/dL] (02/27/20 13:50:00)          BUN: 13 mg/dL [7 mg/dL - 25 mg/dL] (02/27/20 13:50:00)          Creatinine Level: 1 mg/dL [0.6 mg/dL - 1.35 mg/dL] (02/27/20 13:50:00)          BUN/Creat Ratio: NOT APPLICABLE [6  - 22] (02/27/20 13:50:00)          eGFR: 95 mL/min/1.73m2 (02/27/20 13:50:00)          eGFR African American: 110 mL/min/1.73m2 (02/27/20 13:50:00)          Calcium Level: 9.5 mg/dL [8.6 mg/dL - 10.3 mg/dL] (02/27/20 13:50:00)          Bilirubin Total: 0.5 mg/dL [0.2 mg/dL - 1.2 mg/dL] (02/27/20 13:50:00)          Bilirubin Direct: 0.1 mg/dL (02/27/20 13:50:00)          Bilirubin Indirect: 0.4 [0.2  - 1.2]  (02/27/20 13:50:00)          Alkaline Phosphatase: 76 unit/L [36 unit/L - 130 unit/L] (02/27/20 13:50:00)          AST/SGOT: 19 unit/L [10 unit/L - 40 unit/L] (02/27/20 13:50:00)          ALT/SGPT: 21 unit/L [9 unit/L - 46 unit/L] (02/27/20 13:50:00)          Protein Total: 7 gm/dL [6.1 gm/dL - 8.1 gm/dL] (02/27/20 13:50:00)          Albumin Level: 4.3 gm/dL [3.6 gm/dL - 5.1 gm/dL] (02/27/20 13:50:00)          Globulin: 2.7 [1.9  - 3.7] (02/27/20 13:50:00)          A/G Ratio: 1.6 [1  - 2.5] (02/27/20 13:50:00)          LDL Direct: 103 mg/dL High (02/27/20 13:50:00)          TSH: 1.41 mIU/L [0.4 mIU/L - 4.5 mIU/L] (02/27/20 13:50:00)          WBC: 6.6 [3.8  - 10.8] (02/27/20 13:50:00)          RBC: 5.31 [4.2  - 5.8] (02/27/20 13:50:00)          Hgb: 16.4 gm/dL [13.2 gm/dL - 17.1 gm/dL] (02/27/20 13:50:00)          Hct: 46.1 % [38.5 % - 50 %] (02/27/20 13:50:00)          MCV: 86.8 fL [80 fL - 100 fL] (02/27/20 13:50:00)          MCH: 30.9 pg [27 pg - 33 pg] (02/27/20 13:50:00)          MCHC: 35.6 gm/dL [32 gm/dL - 36 gm/dL] (02/27/20 13:50:00)          RDW: 12.4 % [11 % - 15 %] (02/27/20 13:50:00)          Platelet: 322 [140  - 400] (02/27/20 13:50:00)          MPV: 10.5 fL [7.5 fL - 12.5 fL] (02/27/20 13:50:00)          Sed Rate: 2 (02/27/20 13:50:00)

## 2022-02-15 NOTE — NURSING NOTE
Comprehensive Intake Entered On:  1/19/2021 3:29 PM CST    Performed On:  1/19/2021 3:26 PM CST by Carolynn Hall               Summary   Chief Complaint :   Left leg pain, tingling x10 days.  History of blood clots.    Weight Measured :   225.6 lb(Converted to: 225 lb 10 oz, 102.330 kg)    Systolic Blood Pressure :   106 mmHg   Diastolic Blood Pressure :   75 mmHg   Mean Arterial Pressure :   85 mmHg   Peripheral Pulse Rate :   98 bpm   BP Method :   Electronic   HR Method :   Electronic   Temperature Tympanic :   97.8 DegF(Converted to: 36.6 DegC)  (LOW)    Carolynn Hall - 1/19/2021 3:26 PM CST   Health Status   Allergies Verified? :   Yes   Medication History Verified? :   Yes   Tobacco Use? :   Never smoker   Carolynn Hall - 1/19/2021 3:26 PM CST   Meds / Allergies   (As Of: 1/19/2021 3:29:01 PM CST)   Allergies (Active)   No Known Medication Allergies  Estimated Onset Date:   Unspecified ; Created By:   Carson Kramer CMA; Reaction Status:   Active ; Category:   Drug ; Substance:   No Known Medication Allergies ; Type:   Allergy ; Updated By:   Carson Kramer CMA; Reviewed Date:   11/4/2019 3:06 PM CST        Medication List   (As Of: 1/19/2021 3:29:01 PM CST)   Prescription/Discharge Order    apixaban  :   apixaban ; Status:   Processing ; Ordered As Mnemonic:   Eliquis 5 mg oral tablet ; Ordering Provider:   Lori De Guzman PA-C; Action Display:   Complete ; Catalog Code:   apixaban ; Order Dt/Tm:   1/19/2021 3:27:03 PM CST          Miscellaneous Rx Supply  :   Miscellaneous Rx Supply ; Status:   Prescribed ; Ordered As Mnemonic:   Auto-Titrating CPAP 4-16 ; Simple Display Line:   See Instructions, Heated humidifier, heated tubing, filters, nasal or full face mask of choice with headgear.  Replacement cushions and supplies as needed.  Months = 99 (Lifetime), 1 EA, 0 Refill(s) ; Ordering Provider:   Jose Adams MD; Catalog Code:   Miscellaneous Rx Supply ; Order Dt/Tm:   3/13/2018  4:50:29 PM CDT            Home Meds    etanercept  :   etanercept ; Status:   Documented ; Ordered As Mnemonic:   Enbrel ; Simple Display Line:   25 mg, Subcutaneous, 2x/wk, 0 Refill(s) ; Catalog Code:   etanercept ; Order Dt/Tm:   5/11/2010 5:42:16 PM CDT            ID Risk Screen   Recent Travel History :   No recent travel   Family Member Travel History :   No recent travel   Other Exposure to Infectious Disease :   Unknown   Carolynn Hall - 1/19/2021 3:26 PM CST   Social History   Social History   (As Of: 1/19/2021 3:29:01 PM CST)   Alcohol:        Current, Once a week, 2 drinks/episode average.   (Last Updated: 11/8/2011 10:04:01 AM CST by Telma Marshall)          Tobacco:        Never (less than 100 in lifetime)   (Last Updated: 1/19/2021 3:27:23 PM CST by Carolynn Hall)          Electronic Cigarette/Vaping:        Electronic Cigarette Use: Never.   (Last Updated: 1/19/2021 3:27:27 PM CST by Carolynn Hall)          Employment/School:        Employed, Work/School description: Phy.Ed./Health Teacher.   (Last Updated: 11/8/2011 10:01:49 AM CST by eTlma Marshall)          Home/Environment:        Marital status:  (Living together).   (Last Updated: 11/8/2011 10:16:33 AM CST by Telma Marshall)          Exercise:        Exercise frequency: 2-3 times/week.   (Last Updated: 11/8/2011 10:05:42 AM CST by Telma Marshall)

## 2022-02-15 NOTE — TELEPHONE ENCOUNTER
Entered by Karolyn Sevilla CMA on August 27, 2021 2:42:13 PM CDT  ---------------------  From: Karolyn Sevilla CMA   To: [facility] #[id number]    Sent: 8/27/2021 2:42:13 PM CDT  Subject: Medication Management     ** Submitted: **  Order:buPROPion (BuPROPion (Eqv-Wellbutrin SR) 150 mg/12 hours oral tablet, extended release)  1 tab(s)  Oral  bid  Qty:  180 tab(s)        Duration:  90 day(s)        Refills:  0          Substitutions Allowed     Route To [facility] [place] [facility][id number]    Signed by Karolyn Sevilla CMA  8/27/2021 7:42:00 PM [facility]    ** Submitted: **  Complete:buPROPion (BuPROPion (Eqv-Wellbutrin SR) 150 mg/12 hours oral tablet, extended release)   Signed by Karolyn Sevilla CMA  8/27/2021 7:42:00 PM [facility]    ** Not Approved:  **  buPROPion (BUPROPION SR 150MG TABLETS (12 H))  TAKE 1 TABLET BY MOUTH TWICE DAILY  Qty:  60 tab(s)        Days Supply:  30        Refills:  0          Substitutions Allowed     Route To [facility] [place] [facility] #[id number]   Signed by Karolyn Sevilla CMA            ------------------------------------------  From: [facility] #[id number]  To: Jose Adams MD  Sent: August 27, 2021 11:41:28 AM CDT  Subject: Medication Management  Due: August 20, 2021 11:17:32 AM CDT     ** On Hold Pending Signature **     Dispensed Drug: buPROPion (BuPROPion (Eqv-Wellbutrin SR) 150 mg/12 hours oral tablet, extended release), TAKE 1 TABLET BY MOUTH TWICE DAILY  Quantity: 60 tab(s)  Days Supply: 30  Refills: 0  Substitutions Allowed  Notes from Pharmacy:  ------------------------------------------LR 8/5 for # 30. NO RTC in place. Last visit for anxiety in April 2021. Refilled for # 90 and RTC placed for due 6 mo med check in Oct  ** Submitted: **  Order:Return to Clinic (Request)  Details:  RFV: Med check with JEAN MARIE, Return in 10/2021         Signed by Karolyn Sevilla CMA  8/27/2021 7:43:00 PM [facility]

## 2022-03-02 NOTE — TELEPHONE ENCOUNTER
---------------------  From: Yajaira Hagan RN (Phone Messages Pool ([id number]_[facility]))   To: Sleep Message Pool ([id number]_[place] [facility]);     Sent: 1/20/2022 10:09:01 AM CST  Subject: CPAP supplies       PCP:   JEAN MARIE o/c      Time of Call:  9:36am       Person Calling:  pt  Phone number:  [phone]    Returned call at:     Note:  VM received from pt, stating he has dx of sleep apnea. Has not worn his CPAP due to discomfort. Pt inquiring if there are other types of masks that can be trialed?     Please assist pt with alternative CPAP masks.     Last office visit and reason:  11/29/21 med check [facility] and JERRI for patient that he would need to contact his CPAP Supplies company and they could help with choosing a better type of mask

## 2022-03-02 NOTE — TELEPHONE ENCOUNTER
Entered by Telma Escobar MA on January 26, 2022 12:59:23 PM CST  ---------------------  From: Telma Escobar MA   To: [facility] #[id number]    Sent: 1/26/2022 12:59:23 PM CST  Subject: Medication Management     ** Not Approved: Refill not appropriate, refilled 11/29/2021 #90 w/ 2 refills **  rivaroxaban (XARELTO 20MG TABLETS)  TAKE 1 TABLET BY MOUTH EVERY EVENING  Qty:  90 tab(s)        Days Supply:  90        Refills:  0          Substitutions Allowed     Route To Pharmacy - [facility] #[id number]   Signed by Telma Escobar MA            ------------------------------------------  From: [facility] #[id number]  To: Jose Adams MD  Sent: January 26, 2022 3:44:37 AM CST  Subject: Medication Management  Due: January 21, 2022 11:04:18 AM CST     ** On Hold Pending Signature **     Dispensed Drug: rivaroxaban (Xarelto 20 mg oral tablet), TAKE 1 TABLET BY MOUTH EVERY EVENING  Quantity: 90 tab(s)  Days Supply: 90  Refills: 0  Substitutions Allowed  Notes from Pharmacy:  ------------------------------------------

## 2022-06-16 NOTE — PROCEDURES
Accession Number:       [id number]  PATHOLOGIST:     Mica Douglas MD Board Certified in Anatomic Pathology and Clinical Pathology 4  (electronic signature)

## 2022-06-20 ENCOUNTER — VIRTUAL VISIT (OUTPATIENT)
Dept: FAMILY MEDICINE | Facility: CLINIC | Age: 42
End: 2022-06-20
Payer: COMMERCIAL

## 2022-06-20 ENCOUNTER — TELEPHONE (OUTPATIENT)
Dept: FAMILY MEDICINE | Facility: CLINIC | Age: 42
End: 2022-06-20

## 2022-06-20 DIAGNOSIS — J00 ACUTE RHINITIS: ICD-10-CM

## 2022-06-20 DIAGNOSIS — R05.9 COUGH: Primary | ICD-10-CM

## 2022-06-20 PROCEDURE — 99213 OFFICE O/P EST LOW 20 MIN: CPT | Mod: CS | Performed by: FAMILY MEDICINE

## 2022-06-20 NOTE — TELEPHONE ENCOUNTER
Reason for Call:  Other Travel covid test    Detailed comments: Patient calling tested positive for COVID with an at home test. Patient is going on a cruise in a few weeks. Wondering if he needs certain documentation or if he should just proceed with a PCR test for travel. Please advise.     Phone Number Patient can be reached at: [phone]      Best Time: anytime    Can we leave a detailed message on this number? YES    Call taken on 6/20/2022 at 9:25 AM by Hilda Starks

## 2022-06-20 NOTE — PROGRESS NOTES
Telemedicine Visit  Call time: 4:35 PM to 4:40 PM  Patient is at home in [place], [place]  Physician in clinic in [place], [place]    Clinical Decision Making:    At the end of the encounter, I discussed results, diagnosis, medications. Discussed red flags for immediate return to clinic/ER, as well as indications for follow up if no improvement. Patient understood and agreed to plan. Patient was stable for discharge.      ICD-10-CM    1. Cough  R05.9 Symptomatic; Yes; 6/16/2022 COVID-19 Virus (Coronavirus) by PCR Nose   2. Acute rhinitis  J00 Symptomatic; Yes; 6/16/2022 COVID-19 Virus (Coronavirus) by PCR Nose     Will get him tested via curbside testing tomorrow for COVID  We are within 5 days of his symptom onset and could treat him with outpatient COVID medications.  I discussed this with the patient and he declined treatment.      There are no Patient Instructions on file for this visit.   No follow-ups on file.      chief complaint    HPI:  Dimitrios Monreal is a 41 year old male who presents today complaining of testing positive for COVID at home on Thursday, June 16.  He feels like he has cold symptoms including rhinitis and cough.  He otherwise feels fatigued.  No other symptoms.  Denies shortness of breath.  He is immunocompromised from the medication Enbrel to treat his ankylosing spondylitis  He started symptoms on Thursday, June 16    They have a family cruise on July 15 and they need a clinic PCR COVID positive test in order to forego the mandatory testing prior to going on the trip.    History obtained from the patient.    Problem List:  2006-10: Ankylosing spondylitis (H)      No past medical history on file.    Social History     Tobacco Use     Smoking status: Never Smoker     Smokeless tobacco: Never Used   Substance Use Topics     Alcohol use: Not on file       Review of systems  negative except listed in HPI    There were no vitals filed for this visit.    Physical Exam  Telemedicine  visit  In general he is alert, oriented, and in no acute distress  Breathing is not labored and he speaks in full sentences

## 2022-06-21 ENCOUNTER — APPOINTMENT (OUTPATIENT)
Dept: URGENT CARE | Facility: URGENT CARE | Age: 42
End: 2022-06-21
Attending: FAMILY MEDICINE
Payer: COMMERCIAL

## 2022-06-21 PROCEDURE — U0005 INFEC AGEN DETEC AMPLI PROBE: HCPCS | Performed by: FAMILY MEDICINE

## 2022-06-21 PROCEDURE — U0003 INFECTIOUS AGENT DETECTION BY NUCLEIC ACID (DNA OR RNA); SEVERE ACUTE RESPIRATORY SYNDROME CORONAVIRUS 2 (SARS-COV-2) (CORONAVIRUS DISEASE [COVID-19]), AMPLIFIED PROBE TECHNIQUE, MAKING USE OF HIGH THROUGHPUT TECHNOLOGIES AS DESCRIBED BY CMS-2020-01-R: HCPCS | Performed by: FAMILY MEDICINE

## 2022-06-22 ENCOUNTER — TELEPHONE (OUTPATIENT)
Dept: NURSING | Facility: CLINIC | Age: 42
End: 2022-06-22

## 2022-06-22 LAB — SARS-COV-2 RNA RESP QL NAA+PROBE: POSITIVE

## 2022-06-22 NOTE — TELEPHONE ENCOUNTER
Coronavirus (COVID-19) Notification    Caller Name (Patient, parent, daughter/son, grandparent, etc)  Dimitrios    Reason for call  Notify of Positive Coronavirus (COVID-19) lab results, assess symptoms,  review [facility] recommendations    Lab Result    Lab test:  2019-nCoV rRt-PCR or SARS-CoV-2 PCR    Oropharyngeal AND/OR nasopharyngeal swabs is POSITIVE for 2019-nCoV RNA/SARS-COV-2 PCR (COVID-19 virus)      Gather patient reported symptoms   Assessment   Current Symptoms at time of phone call, reported by patient: (if no symptoms, document: No symptoms] No symptoms   Date of symptom(s) onset (if applicable) 6/16/2022     If at time of call, Patients symptoms have worsened, the Patient should contact 911 or have someone drive them to Emergency Dept promptly:      If Patient calling 911, inform 911 personal that you have tested positive for the Coronavirus (COVID-19).  Place mask on and await 911 to arrive.    If Emergency Dept, If possible, please have another adult drive you to the Emergency Dept but you need to wear mask when in contact with other people.      Treatment Options:   Patient classified as COVID treatment eligible by Epic high risk algorithm: Yes  Is the patient symptomatic at the time of result notification? No    Review information with Patient    Your result was positive. This means you have COVID-19 (coronavirus).    How can I protect others?    These guidelines are for isolating before returning to work, school or .    If you DO have symptoms    Stay home and away from others     For at least 5 days after your symptoms started, AND    You are fever free for 24 hours (with no medicine that reduces fever), AND    Your other symptoms are better    Wear a mask for 10 full days anytime you are around others    If you DON'T have symptoms    Stay home and away from others for at least 5 days after your positive test    Wear a mask for 10 full days anytime you are around others    There may  be different guidelines for healthcare facilities.  Please check with the specific sites before arriving.    If you have been told by a doctor that you were severely ill with COVID-19 or are immunocompromised, you should isolate for at least 10 days.    You should not go back to work until you meet the guidelines above for ending your home isolation. You don't need to be retested for COVID-19 before going back to work--studies show that you won't spread the virus if it's been at least 10 days since your symptoms started (or 20 days, if you have a weak immune system).    Employers, schools, and daycares: This is an official notice for this person's medical guidelines for returning in-person.  They must meet the above guidelines before going back to work, school or  in person.    You will receive a positive COVID-19 letter via [employer] or the mail soon with additional self-care information (exception, no letters will be sent to presurgical/preprocedure patients).    Would you like me to review some of that information with you now?  No    If you were tested for an upcoming procedure, please contact your provider for next steps.    Jayne Mccloud

## 2022-06-23 DIAGNOSIS — F32.A DEPRESSION, UNSPECIFIED DEPRESSION TYPE: Primary | ICD-10-CM

## 2022-06-24 RX ORDER — BUPROPION HYDROCHLORIDE 150 MG/1
150 TABLET, EXTENDED RELEASE ORAL 2 TIMES DAILY
Qty: 180 TABLET | Refills: 0 | Status: SHIPPED | OUTPATIENT
Start: 2022-06-24 | End: 2022-07-01

## 2022-06-24 NOTE — TELEPHONE ENCOUNTER
Routing refill request to provider for review/approval because:  A break in medication/Dr. Adams patient.      (cerner):  Last Written Prescription Date:  11/29/21  Last Fill Quantity: 180,  # refills: 0   3 month supply:   Last office visit: Visit date not found with prescribing provider:  11/29/22  Dr. Adams   Future Office Visit:  none

## 2022-07-01 DIAGNOSIS — F32.A DEPRESSION, UNSPECIFIED DEPRESSION TYPE: ICD-10-CM

## 2022-07-01 RX ORDER — BUPROPION HYDROCHLORIDE 150 MG/1
TABLET, EXTENDED RELEASE ORAL
Qty: 180 TABLET | Refills: 0 | Status: SHIPPED | OUTPATIENT
Start: 2022-07-01 | End: 2022-11-22

## 2022-07-01 NOTE — TELEPHONE ENCOUNTER
Routing refill request to provider for review/approval because:    SSRIs Protocol Failed     PHQ-9 score less than 5 in past 6 months        WESTON Bucio  [employer]

## 2022-07-24 ENCOUNTER — HEALTH MAINTENANCE LETTER (OUTPATIENT)
Age: 42
End: 2022-07-24

## 2022-10-03 ENCOUNTER — HEALTH MAINTENANCE LETTER (OUTPATIENT)
Age: 42
End: 2022-10-03

## 2022-11-21 PROBLEM — K42.9 UMBILICAL HERNIA: Status: ACTIVE | Noted: 2022-11-21

## 2022-11-21 PROBLEM — I82.409 DEEP VEIN THROMBOSIS (DVT) OF LOWER EXTREMITY (H): Status: ACTIVE | Noted: 2021-01-04

## 2022-11-21 PROBLEM — M54.59 MECHANICAL LOW BACK PAIN: Status: ACTIVE | Noted: 2022-11-21

## 2022-11-21 PROBLEM — E66.9 OBESITY: Status: ACTIVE | Noted: 2022-11-21

## 2022-11-21 PROBLEM — F41.1 GENERALIZED ANXIETY DISORDER: Status: ACTIVE | Noted: 2022-11-21

## 2022-11-22 ENCOUNTER — OFFICE VISIT (OUTPATIENT)
Dept: FAMILY MEDICINE | Facility: CLINIC | Age: 42
End: 2022-11-22
Payer: COMMERCIAL

## 2022-11-22 VITALS
TEMPERATURE: 97.4 F | RESPIRATION RATE: 14 BRPM | OXYGEN SATURATION: 98 % | SYSTOLIC BLOOD PRESSURE: 122 MMHG | BODY MASS INDEX: 32.97 KG/M2 | DIASTOLIC BLOOD PRESSURE: 74 MMHG | HEART RATE: 86 BPM | WEIGHT: 235.5 LBS | HEIGHT: 71 IN

## 2022-11-22 DIAGNOSIS — I82.492 DEEP VEIN THROMBOSIS (DVT) OF OTHER VEIN OF LEFT LOWER EXTREMITY, UNSPECIFIED CHRONICITY (H): ICD-10-CM

## 2022-11-22 DIAGNOSIS — Z00.00 WELLNESS EXAMINATION: Primary | ICD-10-CM

## 2022-11-22 DIAGNOSIS — Z13.220 LIPID SCREENING: ICD-10-CM

## 2022-11-22 DIAGNOSIS — F32.A DEPRESSION, UNSPECIFIED DEPRESSION TYPE: ICD-10-CM

## 2022-11-22 DIAGNOSIS — D68.51 FACTOR 5 LEIDEN MUTATION, HETEROZYGOUS (H): ICD-10-CM

## 2022-11-22 DIAGNOSIS — F41.1 GENERALIZED ANXIETY DISORDER: ICD-10-CM

## 2022-11-22 DIAGNOSIS — E66.811 CLASS 1 OBESITY DUE TO EXCESS CALORIES WITH SERIOUS COMORBIDITY AND BODY MASS INDEX (BMI) OF 32.0 TO 32.9 IN ADULT: ICD-10-CM

## 2022-11-22 DIAGNOSIS — G47.33 OBSTRUCTIVE SLEEP APNEA SYNDROME: ICD-10-CM

## 2022-11-22 DIAGNOSIS — M45.9 ANKYLOSING SPONDYLITIS, UNSPECIFIED SITE OF SPINE (H): ICD-10-CM

## 2022-11-22 DIAGNOSIS — E66.09 CLASS 1 OBESITY DUE TO EXCESS CALORIES WITH SERIOUS COMORBIDITY AND BODY MASS INDEX (BMI) OF 32.0 TO 32.9 IN ADULT: ICD-10-CM

## 2022-11-22 PROBLEM — B02.9 HERPES ZOSTER: Status: ACTIVE | Noted: 2022-09-25

## 2022-11-22 PROBLEM — K42.9 UMBILICAL HERNIA: Status: RESOLVED | Noted: 2022-11-21 | Resolved: 2022-11-22

## 2022-11-22 PROCEDURE — 91313 COVID-19,PF,MODERNA BIVALENT: CPT | Performed by: INTERNAL MEDICINE

## 2022-11-22 PROCEDURE — 99396 PREV VISIT EST AGE 40-64: CPT | Mod: 25 | Performed by: INTERNAL MEDICINE

## 2022-11-22 PROCEDURE — 80061 LIPID PANEL: CPT | Performed by: INTERNAL MEDICINE

## 2022-11-22 PROCEDURE — 90677 PCV20 VACCINE IM: CPT | Performed by: INTERNAL MEDICINE

## 2022-11-22 PROCEDURE — 36415 COLL VENOUS BLD VENIPUNCTURE: CPT | Performed by: INTERNAL MEDICINE

## 2022-11-22 PROCEDURE — 83721 ASSAY OF BLOOD LIPOPROTEIN: CPT | Mod: 59 | Performed by: INTERNAL MEDICINE

## 2022-11-22 PROCEDURE — 90471 IMMUNIZATION ADMIN: CPT | Performed by: INTERNAL MEDICINE

## 2022-11-22 PROCEDURE — 99213 OFFICE O/P EST LOW 20 MIN: CPT | Mod: 25 | Performed by: INTERNAL MEDICINE

## 2022-11-22 PROCEDURE — 0134A COVID-19,PF,MODERNA BIVALENT: CPT | Performed by: INTERNAL MEDICINE

## 2022-11-22 PROCEDURE — 90472 IMMUNIZATION ADMIN EACH ADD: CPT | Performed by: INTERNAL MEDICINE

## 2022-11-22 PROCEDURE — 90686 IIV4 VACC NO PRSV 0.5 ML IM: CPT | Performed by: INTERNAL MEDICINE

## 2022-11-22 RX ORDER — BUPROPION HYDROCHLORIDE 150 MG/1
150 TABLET, EXTENDED RELEASE ORAL 2 TIMES DAILY
Qty: 180 TABLET | Refills: 3 | Status: SHIPPED | OUTPATIENT
Start: 2022-11-22 | End: 2023-12-29

## 2022-11-22 ASSESSMENT — ENCOUNTER SYMPTOMS
MYALGIAS: 0
ABDOMINAL PAIN: 0
HEMATOCHEZIA: 0
DIARRHEA: 0
WEAKNESS: 0
SLEEP DISTURBANCE: 0
HEARTBURN: 0
COUGH: 0
SORE THROAT: 0
HEMATURIA: 0
NERVOUS/ANXIOUS: 1
ARTHRALGIAS: 0
CONSTIPATION: 0
FREQUENCY: 0
PARESTHESIAS: 0
FEVER: 0
JOINT SWELLING: 0
DYSURIA: 0
PALPITATIONS: 0
HEADACHES: 0
NAUSEA: 0
CHILLS: 0
DIZZINESS: 0
EYE PAIN: 0
SHORTNESS OF BREATH: 0

## 2022-11-22 NOTE — PROGRESS NOTES
SUBJECTIVE:   CC: Dimitrios is an 42 year old who presents for preventative health visit.     Patient has been advised of split billing requirements and indicates understanding: Yes  Healthy Habits:     Getting at least 3 servings of Calcium per day:  Yes    Bi-annual eye exam:  Yes    Dental care twice a year:  Yes    Sleep apnea or symptoms of sleep apnea:  Daytime drowsiness, Excessive snoring and Sleep apnea    Diet:  Regular (no restrictions)    Frequency of exercise:  1 day/week    Duration of exercise:  15-30 minutes    Taking medications regularly:  Yes    Medication side effects:  None    PHQ-2 Total Score: 2    Additional concerns today:  Yes              Today's PHQ-2 Score:   PHQ-2 ( 1999 [employer]) 11/22/2022   Q1: Little interest or pleasure in doing things 1   Q2: Feeling down, depressed or hopeless 1   PHQ-2 Score 2   Q1: Little interest or pleasure in doing things Several days   Q2: Feeling down, depressed or hopeless Several days   PHQ-2 Score 2       Have you ever done Advance Care Planning? (For example, a Health Directive, POLST, or a discussion with a medical provider or your loved ones about your wishes): No, advance care planning information given to patient to review.  Patient plans to discuss their wishes with loved ones or provider.      Social History     Tobacco Use     Smoking status: Never     Smokeless tobacco: Never   Substance Use Topics     Alcohol use: Yes     Alcohol/week: 2.0 standard drinks     Types: 2 Standard drinks or equivalent per week          Alcohol Use 11/22/2022   Prescreen: >3 drinks/day or >7 drinks/week? No       Last PSA: No results found for: PSA    Reviewed orders with patient. Reviewed health maintenance and updated orders accordingly - Yes  Lab work is in process  Labs reviewed in EPIC    Reviewed and updated as needed this visit by clinical staff   Tobacco  Allergies  Meds              Reviewed and updated as needed this visit by Provider                 No past  "medical history on file.   Past Surgical History:   Procedure Laterality Date     LAP VENTRAL HERNIA REPAIR  04/19/2018       Review of Systems   Constitutional: Negative for chills and fever.   HENT: Negative for congestion, ear pain, hearing loss and sore throat.    Eyes: Negative for pain and visual disturbance.   Respiratory: Negative for cough and shortness of breath.    Cardiovascular: Negative for chest pain, palpitations and peripheral edema.   Gastrointestinal: Negative for abdominal pain, constipation, diarrhea, heartburn, hematochezia and nausea.   Genitourinary: Negative for dysuria, frequency, genital sores, hematuria, impotence, penile discharge and urgency.   Musculoskeletal: Negative for arthralgias, joint swelling and myalgias.   Skin: Negative for rash.   Neurological: Negative for dizziness, weakness, headaches and paresthesias.   Psychiatric/Behavioral: Negative for mood changes and sleep disturbance. The patient is nervous/anxious.      Patient is new to my practice today here for a wellness visit.  He is on Enbrel for ankylosing spondylitis to good effect.  Began in his 20s.  It has affected only his back and hip area not peripheral joints.  He has anxiety which is doing well on current therapy.  He had a left DVT in February 2020 and again in January 2021.  Initially treated with 3 months of anticoagulation.  Subsequently anticoagulated until September 2022.  No bleeding problems on anticoagulation.    OBJECTIVE:   /74 (BP Location: Right arm)   Pulse 86   Temp 97.4  F (36.3  C) (Tympanic)   Resp 14   Ht 1.81 m (5' 11.25\")   Wt 106.8 kg (235 lb 8 oz)   SpO2 98%   BMI 32.62 kg/m      Physical Exam  Healthy-appearing man in no distress  Eyes normal  HEENT exam unremarkable.  Mallampati 1.  Neck supple monopoly thyromegaly  Lungs clear  Cardiac exam regular no murmur or gallop.  No edema.  Normal carotid and posterior tibial pulsations  Abdomen soft and nontender.  No paraspinal " "megaly.  No hot joints.  Alert, oriented, speech fluent, memory intact, cranial nerves normal, gait strength and gait normal  Normal mood and affect      Diagnostic Test Results:  Labs reviewed in Epic    ASSESSMENT/PLAN:       ICD-10-CM    1. Wellness examination  Z00.00       2. Obstructive sleep apnea syndrome  G47.33       3. Ankylosing spondylitis, unspecified site of spine (H)  M45.9       4. Generalized anxiety disorder  F41.1       5. Class 1 obesity due to excess calories with serious comorbidity and body mass index (BMI) of 32.0 to 32.9 in adult  E66.09 Comprehensive Weight Management    Z68.32       6. Deep vein thrombosis (DVT) of other vein of left lower extremity, unspecified chronicity (H)  I82.492 Adult Oncology/Hematology  Referral      7. Factor 5 Leiden mutation, heterozygous (H)  D68.51 Adult Oncology/Hematology  Referral      8. Lipid screening  Z13.220 Lipid panel reflex to direct LDL Fasting        Patient with mild sleep apnea and BMI of over 32.  Discussed treatment options and elected to pursue weight management with hopes for a 30+ pound weight loss.    Recurrence of DVT in the left leg in a patient without family history but with heterozygous condition for factor V Leiden.  Discussed the option to continue chronic anticoagulation, referred to hematology, or to do both.  He prefers to see the hematologist for further anticoagulation.    Discussed the need for prostate and daily colon cancer screening, and sometime between age 45-50.    Declined HIV and hepatitis C testing      COUNSELING:   Reviewed preventive health counseling, as reflected in patient instructions      BMI:   Estimated body mass index is 32.62 kg/m  as calculated from the following:    Height as of this encounter: 1.81 m (5' 11.25\").    Weight as of this encounter: 106.8 kg (235 lb 8 oz).   Weight management plan: Patient referred to endocrine and/or weight management specialty      He reports that he " has never smoked. He has never used smokeless tobacco.            Jordy Carver MD  [facility]

## 2022-11-23 LAB
CHOLEST SERPL-MCNC: 258 MG/DL
HDLC SERPL-MCNC: 38 MG/DL
LDLC SERPL CALC-MCNC: ABNORMAL MG/DL
LDLC SERPL DIRECT ASSAY-MCNC: 127 MG/DL
NONHDLC SERPL-MCNC: 220 MG/DL
TRIGL SERPL-MCNC: 460 MG/DL

## 2022-12-22 ENCOUNTER — TELEPHONE (OUTPATIENT)
Dept: HEMATOLOGY | Facility: CLINIC | Age: 42
End: 2022-12-22

## 2023-01-02 NOTE — PROGRESS NOTES
[facility]  [address] [place] [address] [address], [place] [address]  Main: [phone], Fax: [phone]      Video Virtual Visit Note:    Patient: Dimitrios Monreal  MRN: [id number]  : 1980  MIKHAIL: January 3, 2023      Due to the ongoing COVID-19 outbreak, this visit was conducted by video, with the patient's approval.      Reason of today's visit:  History of left posterior tibial vein DVT on 2020. Possible recurrent acute infrapopliteal DVT of the left leg in 2021. Not currently on anticoagulation therapy.      HPI:  Dimitrios is a 42 year old male with a history of ankylosing spondylitis which is controlled by Enbrel (etanercept), who also developed unprovoked left posterior tibial vein thrombosis back in 2020 S/P 3 months of anticoagulation therapy with apixaban and subsequently reportedly found to have heterozygous factor V Leiden mutation, who then apparently reportedly had a recurrent left distal lower extremity DVT in 2021 and was on anticoagulation therapy until 2022, referred by Dr. Jordy Carver of [facility] for consultation. He is currently not on any anticoagulation therapy. He participates in today's scheduled video visit for this appointment.      As mentioned, Dimitrios has a history of ankylosing spondylitis and has been on Enbrel since.     Dated back to 2020, Dimitrios presented to his primary care provider's office with a complaint of 2 days history of insidious onset of moderate to severe left calf pain and swelling. An ultrasound of the left leg was done at the time which showed left calf DVT involving posterior tibial veins from mid to distal calf. He was placed on apixaban and discontinued anticoagulation after 3 months of therapy (until around May 2020). No repeat ultrasound of the left leg was done after his 2020 diagnosis of DVT.      2020, from what I can tell, he had antiphospholipid antibody panel  "testing done at [facility] for which Cardiolipin Abys, Beta 2 Glycoprotein Abys and Lupus anticoagulant testings were all negative.      Then on 1/19/2021, he presented with a 10 days history of left leg pain. Left leg ultrasound showed: \"No color duplex flow within the peroneal veins at the mid to lower calf suggesting an acute infrapopliteal DVT. There appears to have been resolution of the DVT within the mid to inferior posterior tibial veins.\" For this reason, he was restarted on anticoagulation therapy with apixaban.      Then after his recurrent left distal lower extremity DVT on 1/19/2021, he reportedly had a thrombophilia workup done at [facility] and was told that he has heterozygous factor V Leiden mutation, although I cannot find a copy of these labs results in [facility] Everywhere.      He then saw his primary care provider, Dr. Jose Adams on 1/27/2021, who recommended indefinite anticoagulation therapy.      Another follow up with Dr. Adams back in 4/15/2021 and decided to change from apixaban to rivaroxaban for once daily dosing.      Then most recently, he had a follow up with his new primary care provider, Dr. Jordy Carver, when the patient apparently reported that he has stopped taking rivaroxaban since Aug 2022 because of the high cost of the medication (>$2000 for 3 months). Thus the patient is referred to our clinic for consultation.     Currently, Dimitrios is not on any anticoagulation therapy. He denies any lower extremity pain or swelling. He reports that he has new insurance coverage now as of the beginning of 2023.     ROS:  Denies any bleeding complications. Specifically, no frequent epistaxis. No issues with oral mucosal bleeding. Denies any hematuria or blood in stools. Denies any shortness of breath. No chest pain. No cough. No fever.    Medications:   Current Outpatient Medications   Medication     buPROPion (WELLBUTRIN SR) 150 MG 12 hr tablet     etanercept (ENBREL SURECLICK) " 50 MG/ML autoinjector     rivaroxaban ANTICOAGULANT (XARELTO) 20 MG TABS tablet     No current facility-administered medications for this visit.     Allergies:    No Known Allergies    PMH:   No past medical history on file.    Social History:   Deferred    Family History:    He has 2 siblings with no history of venous thromboembolism.     He has 2 daughters, 9 year old and a 12 year old.     No other family members with history of DVT or PE.     Objective:  Visual Examination via Video:  Pleasant in no acute distress.  Normal work of breathing   A+O x 3    Labs:  1/12/2022 at outside facility:  CBC: WBC: 6.3; Hgb 15.9; PLT 338K  AST 17; Creatinine 1.19    Imaging:  Reviewed and are as described above.     Assessment:  In summary, Dimitrios is a 42 year old male with a history of ankylosing spondylitis which is controlled by Enbrel (etanercept), who also developed unprovoked left posterior tibial vein thrombosis back in 2/23/2020 S/P 3 months of anticoagulation therapy with apixaban and subsequently reportedly found to have heterozygous factor V Leiden mutation, who then apparently reportedly had a recurrent left distal lower extremity DVT in 1/19/2021 and was on anticoagulation therapy until Aug 2022, referred by Dr. Jordy Carver of [facility] for consultation.    As mentioned, Dimitrios has ankylosing spondylitis for which this is being controlled by etanercept. In review of literature, there are some isolated case report of increase venous thromboembolic risks with etanercept use. Additionally, ankylosing spondylitis is a chronic inflammatory process and thus by itself could be a risk factor for venous thrombosis. His 2020 DVT was likely an unprovoked event but it could also caused by the fact that he was on etanercept. At the time, he was treated with 3 months of anticoagulation therapy.     Then with my interpretation, he had a recurrent DVT on 1/19/2021 as the distribution of this thrombus in the left  leg as different than the one in 2020. Again he was also on etanercept at the time, otherwise, this was also an unprovoked event.     Additionally, he does have heterozygous factor V Leiden mutation. Which by itself is a minimal risk factor for DVT/PE.     Diagnosis:    Unprovoked DVT of the distal left leg while on etanercept for ankylosis spondylitis back in 2/23/2020 S/P 3 months of anticoagulation therapy with apixaban.     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     Reportedly has heterozygous factor V Leiden mutation.     Ankylosis spondylitis.     Plan:  I have a long discussion with the patient today in regard to our plan and recommendation.     I spent some time today to educate him on DVT/PE, provoked vs unprovoked venous thromboembolic events, and general approach and duration of treatment with anticoagulation therapy. I also answered all his questions to his satisfaction.     I explain to him that in accordance to current American Society of Hematology (VIOLETTA) guidelines, for an unprovoked venous thromboembolic event, the VIOLETTA guidelines recommend consideration of indefinite anticoagulation therapy. The VIOLETTA guidelines also recommends consideration of indefinite anticoagulation therapy for patients who has a chronic risk factor for venous thromboembolism. In this particular case, Dimitrios has multiple reasons to stay on indefinite anticoagulation therapy including his recurrent unprovoked left leg DVT, being on etanercept, heterozygous factor V Leiden mutation and having ankylosis spondylitis.     I explain that Factor V Leiden mutation is a common genetic thrombophilia, about 5% of the general population carries the mutation. Only about 10% of whom with the mutation would ever develop a thromboembolic event in their lifetime and is usually associated with other provoking factors. People with heterozygous Factor  V Leiden Mutation has a 5 folds increase risk of venous thrombosis. For comparison, women who are on estrogen based birth control have about a 7 fold increase risk of venous thrombosis. Thus heterozygous Factor V Leiden Mutation is only a minimal increase risk factor for DVT/PE.     After our discussion, Dimitrios is in agreement with restarting anticoagulation therapy for pharmacological DVT/PE prophylaxis. I do think that a prophylactic dose of rivaroxaban at 10 mg PO Qday dosing should be suffice to prevent recurrent venous thromboembolic events in the future.     My plan is as follow:    Start rivaroxaban at 10 mg PO Qday. I had our clinic's pharmacist check into his current health insurance coverage and they informed this writer that this patient is eligible to sign up for $10 co-pay assistant program and his prescription will only cost him $10 for a 90 days supply.     Get a repeat baseline ultrasound of his left leg for future references in the next 1-2 weeks.     Return for a virtual visit with me in 6 months and considering obtaining CBC, renal function and hepatic function tests at that time.       Video-Visit Details:  Type of service:  Video Visit  Video Start Time: 11:02  Video End Time (time video stopped): 11:24  Originating Location (pt. Location): Home  Distant Location (provider location):  [facility]   Mode of Communication:  Video Conference via [employer] PA-C, MPAS  Physician Assistant  [facility].     62 minutes spent on the date of the encounter doing chart review, history and exam, documentation and further activities per the note

## 2023-01-03 ENCOUNTER — VIRTUAL VISIT (OUTPATIENT)
Dept: HEMATOLOGY | Facility: CLINIC | Age: 43
End: 2023-01-03
Attending: PHYSICIAN ASSISTANT
Payer: COMMERCIAL

## 2023-01-03 DIAGNOSIS — I82.492 DEEP VEIN THROMBOSIS (DVT) OF OTHER VEIN OF LEFT LOWER EXTREMITY, UNSPECIFIED CHRONICITY (H): Primary | ICD-10-CM

## 2023-01-03 DIAGNOSIS — D68.51 FACTOR 5 LEIDEN MUTATION, HETEROZYGOUS (H): ICD-10-CM

## 2023-01-03 DIAGNOSIS — Z86.718 PERSONAL HISTORY OF DVT (DEEP VEIN THROMBOSIS): ICD-10-CM

## 2023-01-03 DIAGNOSIS — Z86.718 HISTORY OF RECURRENT DEEP VEIN THROMBOSIS (DVT): ICD-10-CM

## 2023-01-03 PROCEDURE — G0463 HOSPITAL OUTPT CLINIC VISIT: HCPCS | Mod: PN,GT | Performed by: PHYSICIAN ASSISTANT

## 2023-01-03 PROCEDURE — 99215 OFFICE O/P EST HI 40 MIN: CPT | Mod: GT | Performed by: PHYSICIAN ASSISTANT

## 2023-01-03 NOTE — PROGRESS NOTES
Patient was contacted to complete the pre-visit call prior to their video visit with the provider.      Allergies and medications were reviewed.    I thanked them for their time to cover this information     Jose Juan Sim CMA

## 2023-01-03 NOTE — LETTER
1/3/2023      RE: Dimitrios Monreal  [address]  [place] [address]           [facility]  [address] [place] [address] [address], [place] [address]  Main: [phone], Fax: [phone]      Video Virtual Visit Note:    Patient: Dimitrios Monreal  MRN: [id number]  : 1980  MIKHAIL: January 3, 2023      Due to the ongoing COVID-19 outbreak, this visit was conducted by video, with the patient's approval.      Reason of today's visit:  History of left posterior tibial vein DVT on 2020. Possible recurrent acute infrapopliteal DVT of the left leg in 2021. Not currently on anticoagulation therapy.      HPI:  Dimitrios is a 42 year old male with a history of ankylosing spondylitis which is controlled by Enbrel (etanercept), who also developed unprovoked left posterior tibial vein thrombosis back in 2020 S/P 3 months of anticoagulation therapy with apixaban and subsequently reportedly found to have heterozygous factor V Leiden mutation, who then apparently reportedly had a recurrent left distal lower extremity DVT in 2021 and was on anticoagulation therapy until 2022, referred by Dr. Jordy Carver of [facility] for consultation. He is currently not on any anticoagulation therapy. He participates in today's scheduled video visit for this appointment.      As mentioned, Dimitrios has a history of ankylosing spondylitis and has been on Enbrel since.     Dated back to 2020, Dimitrios presented to his primary care provider's office with a complaint of 2 days history of insidious onset of moderate to severe left calf pain and swelling. An ultrasound of the left leg was done at the time which showed left calf DVT involving posterior tibial veins from mid to distal calf. He was placed on apixaban and discontinued anticoagulation after 3 months of therapy (until around May 2020). No repeat ultrasound of the left leg was done after his 2020 diagnosis of  "DVT.      12/17/2020, from what I can tell, he had antiphospholipid antibody panel testing done at [facility] for which Cardiolipin Abys, Beta 2 Glycoprotein Abys and Lupus anticoagulant testings were all negative.      Then on 1/19/2021, he presented with a 10 days history of left leg pain. Left leg ultrasound showed: \"No color duplex flow within the peroneal veins at the mid to lower calf suggesting an acute infrapopliteal DVT. There appears to have been resolution of the DVT within the mid to inferior posterior tibial veins.\" For this reason, he was restarted on anticoagulation therapy with apixaban.      Then after his recurrent left distal lower extremity DVT on 1/19/2021, he reportedly had a thrombophilia workup done at [facility] and was told that he has heterozygous factor V Leiden mutation, although I cannot find a copy of these labs results in [facility] Everywhere.      He then saw his primary care provider, Dr. Jose Adams on 1/27/2021, who recommended indefinite anticoagulation therapy.      Another follow up with Dr. Adams back in 4/15/2021 and decided to change from apixaban to rivaroxaban for once daily dosing.      Then most recently, he had a follow up with his new primary care provider, Dr. Jordy Carver, when the patient apparently reported that he has stopped taking rivaroxaban since Aug 2022 because of the high cost of the medication (>$2000 for 3 months). Thus the patient is referred to our clinic for consultation.     Currently, Dimitrios is not on any anticoagulation therapy. He denies any lower extremity pain or swelling. He reports that he has new insurance coverage now as of the beginning of 2023.     ROS:  Denies any bleeding complications. Specifically, no frequent epistaxis. No issues with oral mucosal bleeding. Denies any hematuria or blood in stools. Denies any shortness of breath. No chest pain. No cough. No fever.    Medications:   Current Outpatient Medications   Medication "     buPROPion (WELLBUTRIN SR) 150 MG 12 hr tablet     etanercept (ENBREL SURECLICK) 50 MG/ML autoinjector     rivaroxaban ANTICOAGULANT (XARELTO) 20 MG TABS tablet     No current facility-administered medications for this visit.     Allergies:    No Known Allergies    PMH:   No past medical history on file.    Social History:   Deferred    Family History:    He has 2 siblings with no history of venous thromboembolism.     He has 2 daughters, 9 year old and a 12 year old.     No other family members with history of DVT or PE.     Objective:  Visual Examination via Video:  Pleasant in no acute distress.  Normal work of breathing   A+O x 3    Labs:  1/12/2022 at outside facility:  CBC: WBC: 6.3; Hgb 15.9; PLT 338K  AST 17; Creatinine 1.19    Imaging:  Reviewed and are as described above.     Assessment:  In summary, Dimitrios is a 42 year old male with a history of ankylosing spondylitis which is controlled by Enbrel (etanercept), who also developed unprovoked left posterior tibial vein thrombosis back in 2/23/2020 S/P 3 months of anticoagulation therapy with apixaban and subsequently reportedly found to have heterozygous factor V Leiden mutation, who then apparently reportedly had a recurrent left distal lower extremity DVT in 1/19/2021 and was on anticoagulation therapy until Aug 2022, referred by Dr. Jordy Carver of [facility] for consultation.    As mentioned, Dimitrios has ankylosing spondylitis for which this is being controlled by etanercept. In review of literature, there are some isolated case report of increase venous thromboembolic risks with etanercept use. Additionally, ankylosing spondylitis is a chronic inflammatory process and thus by itself could be a risk factor for venous thrombosis. His 2020 DVT was likely an unprovoked event but it could also caused by the fact that he was on etanercept. At the time, he was treated with 3 months of anticoagulation therapy.     Then with my interpretation,  he had a recurrent DVT on 1/19/2021 as the distribution of this thrombus in the left leg as different than the one in 2020. Again he was also on etanercept at the time, otherwise, this was also an unprovoked event.     Additionally, he does have heterozygous factor V Leiden mutation. Which by itself is a minimal risk factor for DVT/PE.     Diagnosis:    Unprovoked DVT of the distal left leg while on etanercept for ankylosis spondylitis back in 2/23/2020 S/P 3 months of anticoagulation therapy with apixaban.     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     Reportedly has heterozygous factor V Leiden mutation.     Ankylosis spondylitis.     Plan:  I have a long discussion with the patient today in regard to our plan and recommendation.     I spent some time today to educate him on DVT/PE, provoked vs unprovoked venous thromboembolic events, and general approach and duration of treatment with anticoagulation therapy. I also answered all his questions to his satisfaction.     I explain to him that in accordance to current American Society of Hematology (VIOLETTA) guidelines, for an unprovoked venous thromboembolic event, the VIOLETTA guidelines recommend consideration of indefinite anticoagulation therapy. The VIOLETTA guidelines also recommends consideration of indefinite anticoagulation therapy for patients who has a chronic risk factor for venous thromboembolism. In this particular case, Dimitrios has multiple reasons to stay on indefinite anticoagulation therapy including his recurrent unprovoked left leg DVT, being on etanercept, heterozygous factor V Leiden mutation and having ankylosis spondylitis.     I explain that Factor V Leiden mutation is a common genetic thrombophilia, about 5% of the general population carries the mutation. Only about 10% of whom with the mutation would ever develop a thromboembolic event in their lifetime  and is usually associated with other provoking factors. People with heterozygous Factor V Leiden Mutation has a 5 folds increase risk of venous thrombosis. For comparison, women who are on estrogen based birth control have about a 7 fold increase risk of venous thrombosis. Thus heterozygous Factor V Leiden Mutation is only a minimal increase risk factor for DVT/PE.     After our discussion, Dimitrios is in agreement with restarting anticoagulation therapy for pharmacological DVT/PE prophylaxis. I do think that a prophylactic dose of rivaroxaban at 10 mg PO Qday dosing should be suffice to prevent recurrent venous thromboembolic events in the future.     My plan is as follow:    Start rivaroxaban at 10 mg PO Qday. I had our clinic's pharmacist check into his current health insurance coverage and they informed this writer that this patient is eligible to sign up for $10 co-pay assistant program and his prescription will only cost him $10 for a 90 days supply.     Get a repeat baseline ultrasound of his left leg for future references in the next 1-2 weeks.     Return for a virtual visit with me in 6 months and considering obtaining CBC, renal function and hepatic function tests at that time.       Video-Visit Details:  Type of service:  Video Visit  Video Start Time: 11:02  Video End Time (time video stopped): 11:24  Originating Location (pt. Location): Home  Distant Location (provider location):  [facility]   Mode of Communication:  Video Conference via Roland Parr PA-C, MPAS  Physician Assistant  [facility].     62 minutes spent on the date of the encounter doing chart review, history and exam, documentation and further activities per the note            Patient was contacted to complete the pre-visit call prior to their video visit with the provider.      Allergies and medications were  reviewed.    I thanked them for their time to cover this information     Jose Juan Sim, CMA

## 2023-01-03 NOTE — PATIENT INSTRUCTIONS
Dimitrios,    It was nice to meet you via video visit today.    Below is a summary of our plan:  Will start you back on anticoagulation therapy with rivaroxaban (Xarelto) at 10 mg by mouth every 24 hours with food. We will plan on keeping you on this indefinitely for future prevention of venous thromboembolism.   One of our nursing staffs will contact you to schedule you for a repeat left leg ultrasound to be done in the next 1-2 weeks. This will serve as our reference for the future.   If you should have any further questions, or experience any unusual bleeding issues or if you should need any invasive or surgical procedures in the future, please call us at [phone] and ask to speak to a nursing staff.  One of our administrative assistants will contact you to schedule your return visit with me virtually in 6 months.     Thank you once again in choosing our clinic as part of your healthcare team.      Lan Parr PA-C, MPAS  Physician Assistant  [facility].

## 2023-01-11 ENCOUNTER — TELEPHONE (OUTPATIENT)
Dept: HEMATOLOGY | Facility: CLINIC | Age: 43
End: 2023-01-11

## 2023-01-11 NOTE — TELEPHONE ENCOUNTER
[id number]  Dimitrios Monreal  42 year old male  [facility] Diagnosis: DVT  [facility] Provider: Lan Parr PA-C    RN called Dimitrios to arrange for follow up. He will get his ultrasound done at [facility] next week. [facility]  will reach out to arrange 6 month follow up .     Janiya Cr  MSN, RN, PHN  [facility]  Office: [phone]  Fax: [phone]

## 2023-01-17 ENCOUNTER — ANCILLARY PROCEDURE (OUTPATIENT)
Dept: VASCULAR ULTRASOUND | Facility: CLINIC | Age: 43
End: 2023-01-17
Attending: PHYSICIAN ASSISTANT
Payer: COMMERCIAL

## 2023-01-17 DIAGNOSIS — D68.51 FACTOR 5 LEIDEN MUTATION, HETEROZYGOUS (H): ICD-10-CM

## 2023-01-17 DIAGNOSIS — Z86.718 HISTORY OF RECURRENT DEEP VEIN THROMBOSIS (DVT): ICD-10-CM

## 2023-01-17 DIAGNOSIS — I82.492 DEEP VEIN THROMBOSIS (DVT) OF OTHER VEIN OF LEFT LOWER EXTREMITY, UNSPECIFIED CHRONICITY (H): ICD-10-CM

## 2023-01-17 DIAGNOSIS — Z86.718 PERSONAL HISTORY OF DVT (DEEP VEIN THROMBOSIS): ICD-10-CM

## 2023-01-17 PROCEDURE — 93971 EXTREMITY STUDY: CPT | Mod: LT

## 2023-01-25 ENCOUNTER — OFFICE VISIT (OUTPATIENT)
Dept: FAMILY MEDICINE | Facility: CLINIC | Age: 43
End: 2023-01-25
Payer: COMMERCIAL

## 2023-01-25 VITALS
RESPIRATION RATE: 15 BRPM | OXYGEN SATURATION: 96 % | WEIGHT: 230.7 LBS | HEART RATE: 77 BPM | HEIGHT: 72 IN | BODY MASS INDEX: 31.25 KG/M2 | SYSTOLIC BLOOD PRESSURE: 110 MMHG | DIASTOLIC BLOOD PRESSURE: 80 MMHG

## 2023-01-25 DIAGNOSIS — E66.811 CLASS 1 OBESITY DUE TO EXCESS CALORIES WITH SERIOUS COMORBIDITY AND BODY MASS INDEX (BMI) OF 31.0 TO 31.9 IN ADULT: Primary | ICD-10-CM

## 2023-01-25 DIAGNOSIS — E88.810 METABOLIC SYNDROME X: ICD-10-CM

## 2023-01-25 DIAGNOSIS — G47.33 OBSTRUCTIVE SLEEP APNEA SYNDROME: ICD-10-CM

## 2023-01-25 DIAGNOSIS — E66.09 CLASS 1 OBESITY DUE TO EXCESS CALORIES WITH SERIOUS COMORBIDITY AND BODY MASS INDEX (BMI) OF 31.0 TO 31.9 IN ADULT: Primary | ICD-10-CM

## 2023-01-25 PROBLEM — D68.51 ACTIVATED PROTEIN C RESISTANCE (H): Status: ACTIVE | Noted: 2022-11-22

## 2023-01-25 PROCEDURE — 99215 OFFICE O/P EST HI 40 MIN: CPT | Performed by: FAMILY MEDICINE

## 2023-01-25 RX ORDER — NALTREXONE HYDROCHLORIDE 50 MG/1
TABLET, FILM COATED ORAL
Qty: 30 TABLET | Refills: 1 | Status: SHIPPED | OUTPATIENT
Start: 2023-01-25 | End: 2024-01-08

## 2023-01-25 NOTE — PATIENT INSTRUCTIONS
[email or web address] podcast Bree    Why we get sick:    - Charlie Figueroa    The case for keto:   - Tanner Almazan    Macronutrient Goals    Minimum 2-4 meals per day, control daily calories   - 1200  female   - 1600 -2000 male  Minimum of 4 palm servings of protein daily.  Begin everyday with protein    - ~80 gm for female   - 120 gm for male  Be mindful of net carbs 50-75 gm/day  - (Total carbs - fiber)   - Less than 5 gm of carbs with breakfast

## 2023-01-25 NOTE — PROGRESS NOTES
"    New Medical Weight Management Consult    PATIENT:  Dimitrios Monreal  MRN:         [id number]  :         1980  MIKHAIL:         2023    Dear Dr. Carver,    I had the pleasure of seeing your patient, Dimitrios Monreal. Full intake/assessment was done to determine barriers to weight loss success and develop a treatment plan. Dimitrios Monreal is a 42 year old male interested in treatment of medical problems associated with excess weight. He has a height of 5' 11.5\", a weight of 230 lbs 11.2 oz, and the calculated Body mass index is 31.73 kg/m .    ASSESSMENT/PLAN:  Class 1 obesity due to excess calories with serious comorbidity and body mass index (BMI) of 31.0 to 31.9 in adult  42 year old year old male in clinic today to discuss treatment of the following conditions through diet and lifestyle modification and weight loss:  1. Class 1 obesity due to excess calories with serious comorbidity and body mass index (BMI) of 31.0 to 31.9 in adult    2. Metabolic syndrome X    3. Obstructive sleep apnea syndrome      Intake: 42-year-old man with history of dyslipidemia, obstructive sleep apnea and metabolic syndrome (with ankylosing spondylitis) presenting for discussion of medical weight loss.  We had lengthy discussion of various components to nutrition.  I believe he would do well on a carbohydrate restricted diet while focusing on protein.  We reviewed caloric and macronutrient goals.  There is no obvious contraindication to any of the medications that have FDA approval for weight loss.  However, he is already on Wellbutrin and is found this medicine be helpful.  We will add naltrexone (to approximate Contrave).  I suspect that the financial burden of a GLP-1 receptor agonist would be minimal given that he meets his deductible every year (from his ongoing use of Enbrel).  For now we will hold off.  He has been successful with modest weight loss as he is gone off of sugar sweetened beverages and fast food.  We " "discussed different strategies for nutrition.  Stress is high and quality of sleep is poor.  These are barriers which were reviewed today.  If he struggles, we will reconsider pharmaceutical options and consider whether or not there are any other interventions for obstructive sleep apnea that might be more tolerable than his CPAP machine.  - I would like to review progress with this patient in 6-8 weeks.  Video or face-to-face appropriate.    FOLLOW-UP:   8 weeks.    SUBJECTIVE:     He has the following co-morbidities:       1/24/2023   I have the following health issues associated with obesity: Sleep Apnea   I have the following symptoms associated with obesity: Fatigue     Narrative History:    - anxiety high in context of early pandemic.  Medications.   - he has had times when he does not eat during the day and would stop on his way home for pre-dinner dinner.   - ankylsing spondylitis well controlled.  Follows with [facility].        - wife does not cook. He has 13 year old and 9 year old.    - eating protein throughout the day.    - he stopped drinking pop.   - now drinking water (120 oz per day).  He will flavor water.       - no longer eating after 7pm.      Patient Goals 1/24/2023   I am interested in having a healthier weight to diminish current health problems: Yes   I am interested in having a healthier weight in order to prevent future health problems: Yes   I am interested in having a healthier weight in order to have a future surgery: No     Referring Provider 1/24/2023   Please name the provider who referred you to Medical Weight Management.  If you do not know, please answer: \"I Don't Know\". Dr. Chavira     Weight History 1/24/2023   How concerned are you about your weight? Somewhat Concerned   Would you describe your weight gain as gradual? Yes   I became overweight: As an Adult   The following factors have contributed to my weight gain:  Mental Health Issues, Change in Schedule, Eating Wrong Types of " Food, Eating Too Much, Lack of Exercise, Stress   I have tried the following methods to lose weight: Watching Portions or Calories, Exercise, Fasting, Other   My lowest weight since age 19 was: 180   My highest weight since age 19 was: 241   The most weight I have ever lost was: (lbs) 10   I have the following family history of obesity/being overweight:  My mother is overweight, My father is overweight, One or more of my siblings are overweight   Has anyone in your family had weight loss surgery? No   How has your weight changed over the last year?  Gained   How many pounds? 10     Diet Recall Review with Patient 1/24/2023   Do you typically eat breakfast? No   Do you typically eat lunch? Yes   Do you typically eat supper? Yes   Do you typically eat snacks? Yes   Do you like vegetables?  Yes   Do you drink water? Yes   How many of glasses of milk do you drink in a typical day? 1   If you do drink milk, what type? Skim   How many cans/bottles of sugar pop/soda/tea/sports drinks do you drink in a day? 2   How many cans/bottles of diet pop/soda/tea or sports drink do you drink in a day? 0   How often do you have a drink of alcohol? 2-3 TImes a Week   If you do drink, how many drinks might you have in a day? 1 or 2     Eating Habits 1/24/2023   Generally, my meals include foods like these: bread, pasta, rice, potatoes, corn, crackers, sweet dessert, pop, or juice. Once a Week   Generally, my meals include foods like these: fried meats, brats, burgers, french fries, pizza, cheese, chips, or ice cream. A Few Times a Week   Eat fast food (like McDonalds, [employer] Didier, Taco Bell). Less Than Weekly   Eat at a buffet or sit-down restaurant. Once a Week   Eat most of my meals in front of the TV or computer. A Few Times a Week   Often skip meals, eat at random times, have no regular eating times. A Few Times a Week   Rarely sit down for a meal but snack or graze throughout.  A Few Times a Week   Eat extra snacks between meals. A  Few Times a Week   Eat most of my food at the end of the day. A Few Times a Week   Eat in the middle of the night or wake up at night to eat. Never   Eat extra snacks to prevent or correct low blood sugar. Never   Eat to prevent acid reflux or stomach pain. Never   Worry about not having enough food to eat. Never   Have you been to the food shelf at least a few times this year? No   I eat when I am depressed. Never   I eat when I am stressed. A Few Times a Week   I eat when I am bored. A Few Times a Week   I eat when I am anxious. Once a Week   I eat when I am happy or as a reward. Once a Week   I feel hungry all the time even if I just have eaten. Once a Week   Feeling full is important to me. Once a Week   I finish all the food on my plate even if I am already full. A Few Times a Week   I can't resist eating delicious food or walk past the good food/smell. Once a Week   I eat/snack without noticing that I am eating. Never   I eat when I am preparing the meal. Less Than Weekly   I eat more than usual when I see others eating. Never   I have trouble not eating sweets, ice cream, cookies, or chips if they are around the house. Once a Week   I think about food all day. Never   What foods, if any, do you crave? Sweets/Candy/Chocolate     Amount of Food 1/24/2023   I make myself vomit what I have eaten or use laxatives to get rid of food. Never   I eat a large amount of food, like a loaf of bread, a box of cookies, a pint/quart of ice cream, all at once. Never   I eat a large amount of food even when I am not hungry. Weekly   I eat rapidly. Never   I eat alone because I feel embarrassed and do not want others to see how much I have eaten. Never   I eat until I am uncomfortably full. Never   I feel bad, disgusted, or guilty after I overeat. Never   I make myself vomit what I have eaten or use laxatives to get rid of food. Never     Activity/Exercise History 1/24/2023   How much of a typical 12 hour day do you spend  sitting? Less Than Half the Day   How much of a typical 12 hour day do you spend lying down? Less Than Half the Day   How much of a typical day do you spend walking/standing? Less Than Half the Day   How many hours (not including work) do you spend on the TV/Video Games/Computer/Tablet/Phone? 2-3 Hours   How many times a week are you active for the purpose of exercise? 2-3 Times a Week   What keeps you from being more active? Lack of Time, Unsure What To Do   How many total minutes do you spend doing some activity for the purpose of exercising when you exercise? 15-30 Minutes     PAST MEDICAL HISTORY:  Past Medical History:   Diagnosis Date     Arthritis 2006     Mononucleosis 12/11/2016     Parotitis 12/11/2016     Work/Social History Reviewed With Patient 1/24/2023   My employment status is: Full-Time   My job is: Principal   How much of your job is spent on the computer or phone? 75%   How many hours do you spend commuting to work daily?  1.5   What is your marital status? /In a Relationship   If in a relationship, is your significant other overweight? Yes   Do you have children? Yes   If you have children, are they overweight? No   Are they supportive of your health goals? Yes   Who does the food shopping?  Me     Mental Health History Reviewed With Patient 1/24/2023   Have you ever been physically or sexually abused? No   If yes, do you feel that the abuse is affecting your weight? N/A   If yes, would you like to talk to a counselor about the abuse? N/A   How often in the past 2 weeks have you felt little interest or pleasure in doing things? Not at all   Over the past 2 weeks how often have you felt down, depressed, or hopeless? For Several Days     Sleep History Reviewed With Patient 1/24/2023   How many hours do you sleep at night? 6   Do you think that you snore loudly or has anybody ever heard you snore loudly (louder than talking or so loud it can be heard behind a shut door)? Yes   Has anyone seen  or heard you stop breathing during your sleep? Yes   Do you often feel tired, fatigued, or sleepy during the day? Yes   Do you have a TV/Computer in your bedroom? Yes     MEDICATIONS:   Current Outpatient Medications   Medication Sig Dispense Refill     buPROPion (WELLBUTRIN SR) 150 MG 12 hr tablet Take 1 tablet (150 mg) by mouth 2 times daily for 90 days 180 tablet 3     etanercept (ENBREL SURECLICK) 50 MG/ML autoinjector Inject 50 mg Subcutaneous       naltrexone (DEPADE/REVIA) 50 MG tablet Take 25 mg twice daily (Take 1/2 tab in the evening for the first week.  Add the morning dose if the evening dose is tolerated). 30 tablet 1     rivaroxaban ANTICOAGULANT (XARELTO) 10 MG TABS tablet Take 1 tablet (10 mg) by mouth daily (with dinner) 90 tablet 1     ALLERGIES:   No Known Allergies  PHYSICAL EXAM:  Physical Exam  Nursing note reviewed.   Constitutional:       General: He is not in acute distress.     Appearance: Normal appearance. He is obese. He is not ill-appearing.   HENT:      Head: Normocephalic and atraumatic.      Right Ear: External ear normal.      Left Ear: External ear normal.   Eyes:      General: No scleral icterus.     Extraocular Movements: Extraocular movements intact.      Conjunctiva/sclera: Conjunctivae normal.   Cardiovascular:      Rate and Rhythm: Normal rate and regular rhythm.      Heart sounds: Normal heart sounds. No murmur heard.    No friction rub. No gallop.   Pulmonary:      Effort: Pulmonary effort is normal. No respiratory distress.      Breath sounds: Normal breath sounds. No wheezing or rales.   Abdominal:      General: There is no distension.      Palpations: There is no mass.      Tenderness: There is no abdominal tenderness.   Musculoskeletal:         General: No swelling. Normal range of motion.      Right lower leg: No edema.      Left lower leg: No edema.   Skin:     General: Skin is warm.      Coloration: Skin is not jaundiced.      Findings: No rash.      Comments: Skin,  turgor, texture color is normal. Skin tags: Yes, striae: No, hirsutism: n/a, acanthosis nigricans: No.   Neurological:      General: No focal deficit present.      Mental Status: He is alert and oriented to person, place, and time. Mental status is at baseline.      Cranial Nerves: No cranial nerve deficit.      Deep Tendon Reflexes: Reflexes normal.   Psychiatric:         Attention and Perception: Attention normal.         Mood and Affect: Mood normal.         Speech: Speech normal.         Thought Content: Thought content normal.       65 minutes spent on the date of the encounter doing chart review, history and exam, documentation and further activities per the note    This note has been dictated using voice recognition software. Any grammatical or context distortions are unintentional and inherent to the software    Sincerely,    Ever Manzo MD  Diplomate - American Board of Obesity Medicine  Diplomate - American Board of Family Medicine  [facility] - Comprehensive Weight Management

## 2023-01-25 NOTE — ASSESSMENT & PLAN NOTE
42 year old year old male in clinic today to discuss treatment of the following conditions through diet and lifestyle modification and weight loss:  1. Class 1 obesity due to excess calories with serious comorbidity and body mass index (BMI) of 31.0 to 31.9 in adult    2. Metabolic syndrome X    3. Obstructive sleep apnea syndrome      Intake: 42-year-old man with history of dyslipidemia, obstructive sleep apnea and metabolic syndrome (with ankylosing spondylitis) presenting for discussion of medical weight loss.  We had lengthy discussion of various components to nutrition.  I believe he would do well on a carbohydrate restricted diet while focusing on protein.  We reviewed caloric and macronutrient goals.  There is no obvious contraindication to any of the medications that have FDA approval for weight loss.  However, he is already on Wellbutrin and is found this medicine be helpful.  We will add naltrexone (to approximate Contrave).  I suspect that the financial burden of a GLP-1 receptor agonist would be minimal given that he meets his deductible every year (from his ongoing use of Enbrel).  For now we will hold off.  He has been successful with modest weight loss as he is gone off of sugar sweetened beverages and fast food.  We discussed different strategies for nutrition.  Stress is high and quality of sleep is poor.  These are barriers which were reviewed today.  If he struggles, we will reconsider pharmaceutical options and consider whether or not there are any other interventions for obstructive sleep apnea that might be more tolerable than his CPAP machine.  - I would like to review progress with this patient in 6-8 weeks.  Video or face-to-face appropriate.

## 2023-07-10 ENCOUNTER — TELEPHONE (OUTPATIENT)
Dept: HEMATOLOGY | Facility: CLINIC | Age: 43
End: 2023-07-10
Payer: COMMERCIAL

## 2023-08-04 ENCOUNTER — TELEPHONE (OUTPATIENT)
Dept: HEMATOLOGY | Facility: CLINIC | Age: 43
End: 2023-08-04
Payer: COMMERCIAL

## 2023-10-23 ENCOUNTER — PATIENT OUTREACH (OUTPATIENT)
Dept: CARE COORDINATION | Facility: CLINIC | Age: 43
End: 2023-10-23
Payer: COMMERCIAL

## 2023-12-28 DIAGNOSIS — F32.A DEPRESSION, UNSPECIFIED DEPRESSION TYPE: ICD-10-CM

## 2023-12-29 RX ORDER — BUPROPION HYDROCHLORIDE 150 MG/1
150 TABLET, EXTENDED RELEASE ORAL 2 TIMES DAILY
Qty: 180 TABLET | Refills: 3 | Status: SHIPPED | OUTPATIENT
Start: 2023-12-29

## 2023-12-31 ENCOUNTER — HEALTH MAINTENANCE LETTER (OUTPATIENT)
Age: 43
End: 2023-12-31

## 2024-01-08 ENCOUNTER — OFFICE VISIT (OUTPATIENT)
Dept: FAMILY MEDICINE | Facility: CLINIC | Age: 44
End: 2024-01-08
Payer: COMMERCIAL

## 2024-01-08 VITALS
HEIGHT: 71 IN | BODY MASS INDEX: 30.8 KG/M2 | WEIGHT: 220 LBS | SYSTOLIC BLOOD PRESSURE: 111 MMHG | TEMPERATURE: 96.2 F | HEART RATE: 60 BPM | DIASTOLIC BLOOD PRESSURE: 75 MMHG | RESPIRATION RATE: 20 BRPM

## 2024-01-08 DIAGNOSIS — I82.492 DEEP VEIN THROMBOSIS (DVT) OF OTHER VEIN OF LEFT LOWER EXTREMITY, UNSPECIFIED CHRONICITY (H): ICD-10-CM

## 2024-01-08 DIAGNOSIS — E66.09 CLASS 1 OBESITY DUE TO EXCESS CALORIES WITH SERIOUS COMORBIDITY AND BODY MASS INDEX (BMI) OF 30.0 TO 30.9 IN ADULT: ICD-10-CM

## 2024-01-08 DIAGNOSIS — Z11.59 NEED FOR HEPATITIS C SCREENING TEST: ICD-10-CM

## 2024-01-08 DIAGNOSIS — E78.5 DYSLIPIDEMIA: ICD-10-CM

## 2024-01-08 DIAGNOSIS — M45.9 ANKYLOSING SPONDYLITIS, UNSPECIFIED SITE OF SPINE (H): ICD-10-CM

## 2024-01-08 DIAGNOSIS — D68.51 FACTOR 5 LEIDEN MUTATION, HETEROZYGOUS (H): ICD-10-CM

## 2024-01-08 DIAGNOSIS — D68.51 ACTIVATED PROTEIN C RESISTANCE (H): ICD-10-CM

## 2024-01-08 DIAGNOSIS — F41.1 GENERALIZED ANXIETY DISORDER: ICD-10-CM

## 2024-01-08 DIAGNOSIS — Z11.4 SCREENING FOR HIV (HUMAN IMMUNODEFICIENCY VIRUS): ICD-10-CM

## 2024-01-08 DIAGNOSIS — Z00.00 WELLNESS EXAMINATION: Primary | ICD-10-CM

## 2024-01-08 DIAGNOSIS — G47.33 OBSTRUCTIVE SLEEP APNEA SYNDROME: ICD-10-CM

## 2024-01-08 DIAGNOSIS — E66.811 CLASS 1 OBESITY DUE TO EXCESS CALORIES WITH SERIOUS COMORBIDITY AND BODY MASS INDEX (BMI) OF 30.0 TO 30.9 IN ADULT: ICD-10-CM

## 2024-01-08 DIAGNOSIS — I82.402 RECURRENT ACUTE DEEP VEIN THROMBOSIS (DVT) OF LEFT LOWER EXTREMITY (H): ICD-10-CM

## 2024-01-08 PROCEDURE — 99396 PREV VISIT EST AGE 40-64: CPT | Mod: 25 | Performed by: INTERNAL MEDICINE

## 2024-01-08 PROCEDURE — 90471 IMMUNIZATION ADMIN: CPT | Performed by: INTERNAL MEDICINE

## 2024-01-08 PROCEDURE — 99214 OFFICE O/P EST MOD 30 MIN: CPT | Mod: 25 | Performed by: INTERNAL MEDICINE

## 2024-01-08 PROCEDURE — 90746 HEPB VACCINE 3 DOSE ADULT IM: CPT | Performed by: INTERNAL MEDICINE

## 2024-01-08 ASSESSMENT — ENCOUNTER SYMPTOMS
FEVER: 0
NERVOUS/ANXIOUS: 0
SLEEP DISTURBANCE: 0
DIZZINESS: 0
HEADACHES: 0
PALPITATIONS: 0
WEAKNESS: 0
DIARRHEA: 0
FREQUENCY: 0
ABDOMINAL PAIN: 0
NERVOUS/ANXIOUS: 1
JOINT SWELLING: 0
COUGH: 0
SORE THROAT: 0
PARESTHESIAS: 0
HEMATURIA: 0
CONSTIPATION: 0
EYE PAIN: 0
SHORTNESS OF BREATH: 0
DYSURIA: 0
MYALGIAS: 0
CHILLS: 0
NAUSEA: 0
ARTHRALGIAS: 0
HEARTBURN: 0
HEMATOCHEZIA: 0

## 2024-01-08 ASSESSMENT — PATIENT HEALTH QUESTIONNAIRE - PHQ9
10. IF YOU CHECKED OFF ANY PROBLEMS, HOW DIFFICULT HAVE THESE PROBLEMS MADE IT FOR YOU TO DO YOUR WORK, TAKE CARE OF THINGS AT HOME, OR GET ALONG WITH OTHER PEOPLE: NOT DIFFICULT AT ALL
SUM OF ALL RESPONSES TO PHQ QUESTIONS 1-9: 1
SUM OF ALL RESPONSES TO PHQ QUESTIONS 1-9: 1

## 2024-01-08 ASSESSMENT — ANXIETY QUESTIONNAIRES
7. FEELING AFRAID AS IF SOMETHING AWFUL MIGHT HAPPEN: NOT AT ALL
2. NOT BEING ABLE TO STOP OR CONTROL WORRYING: SEVERAL DAYS
GAD7 TOTAL SCORE: 3
GAD7 TOTAL SCORE: 3
8. IF YOU CHECKED OFF ANY PROBLEMS, HOW DIFFICULT HAVE THESE MADE IT FOR YOU TO DO YOUR WORK, TAKE CARE OF THINGS AT HOME, OR GET ALONG WITH OTHER PEOPLE?: SOMEWHAT DIFFICULT
3. WORRYING TOO MUCH ABOUT DIFFERENT THINGS: SEVERAL DAYS
IF YOU CHECKED OFF ANY PROBLEMS ON THIS QUESTIONNAIRE, HOW DIFFICULT HAVE THESE PROBLEMS MADE IT FOR YOU TO DO YOUR WORK, TAKE CARE OF THINGS AT HOME, OR GET ALONG WITH OTHER PEOPLE: SOMEWHAT DIFFICULT
6. BECOMING EASILY ANNOYED OR IRRITABLE: NOT AT ALL
4. TROUBLE RELAXING: NOT AT ALL
1. FEELING NERVOUS, ANXIOUS, OR ON EDGE: SEVERAL DAYS
5. BEING SO RESTLESS THAT IT IS HARD TO SIT STILL: NOT AT ALL
GAD7 TOTAL SCORE: 3
7. FEELING AFRAID AS IF SOMETHING AWFUL MIGHT HAPPEN: NOT AT ALL

## 2024-01-08 NOTE — ASSESSMENT & PLAN NOTE
HST 2/22/18 AHI 9.3 with oximetry nadit 74%  Snores minimally and has no daytime sleepiness with 15 to 20 pound weight

## 2024-01-08 NOTE — PROGRESS NOTES
SUBJECTIVE:   Dimitrios is a 43 year old, presenting for the following:  Physical (Pt here for Px and anxiety medcheck.  Pt is not fasting today) and Anxiety        1/8/2024     3:17 PM   Additional Questions   Roomed by Dawit OVIEDO       Healthy Habits:     Getting at least 3 servings of Calcium per day:  NO    Bi-annual eye exam:  Yes    Dental care twice a year:  Yes    Sleep apnea or symptoms of sleep apnea:  Daytime drowsiness    Diet:  Regular (no restrictions)    Frequency of exercise:  1 day/week    Duration of exercise:  15-30 minutes    Taking medications regularly:  Yes    Barriers to taking medications:  None    Medication side effects:  None    Additional concerns today:  Yes      Today's PHQ-9 Score:       1/8/2024     3:05 PM   PHQ-9 SCORE   PHQ-9 Total Score MyChart 1 (Minimal depression)   PHQ-9 Total Score 1         Anxiety Follow-Up  How are you doing with your anxiety since your last visit? Improved   Are you having other symptoms that might be associated with anxiety? No  Have you had a significant life event? No   Are you feeling depressed? No  Do you have any concerns with your use of alcohol or other drugs? No    Social History     Tobacco Use    Smoking status: Never     Passive exposure: Never    Smokeless tobacco: Never   Vaping Use    Vaping Use: Never used   Substance Use Topics    Alcohol use: Yes     Alcohol/week: 2.0 standard drinks of alcohol     Types: 2 Standard drinks or equivalent per week    Drug use: Never         1/8/2024     3:06 PM   ANTONI-7 SCORE   Total Score 3 (minimal anxiety)   Total Score 3         1/8/2024     3:05 PM   PHQ   PHQ-9 Total Score 1   Q9: Thoughts of better off dead/self-harm past 2 weeks Not at all         1/8/2024     3:05 PM   Last PHQ-9   1.  Little interest or pleasure in doing things 0   2.  Feeling down, depressed, or hopeless 0   3.  Trouble falling or staying asleep, or sleeping too much 0   4.  Feeling tired or having little energy 1   5.  Poor appetite  "or overeating 0   6.  Feeling bad about yourself 0   7.  Trouble concentrating 0   8.  Moving slowly or restless 0   Q9: Thoughts of better off dead/self-harm past 2 weeks 0   PHQ-9 Total Score 1         1/8/2024     3:06 PM   ANTONI-7    1. Feeling nervous, anxious, or on edge 1   2. Not being able to stop or control worrying 1   3. Worrying too much about different things 1   4. Trouble relaxing 0   5. Being so restless that it is hard to sit still 0   6. Becoming easily annoyed or irritable 0   7. Feeling afraid, as if something awful might happen 0   ANTONI-7 Total Score 3   If you checked any problems, how difficult have they made it for you to do your work, take care of things at home, or get along with other people? Somewhat difficult         Social History     Tobacco Use    Smoking status: Never     Passive exposure: Never    Smokeless tobacco: Never   Substance Use Topics    Alcohol use: Yes     Alcohol/week: 2.0 standard drinks of alcohol     Types: 2 Standard drinks or equivalent per week             1/8/2024     3:08 PM   Alcohol Use   Prescreen: >3 drinks/day or >7 drinks/week? No       Last PSA: No results found for: \"PSA\"    Reviewed orders with patient. Reviewed health maintenance and updated orders accordingly - Yes  Lab work is in process  Labs reviewed in EPIC    Reviewed and updated as needed this visit by clinical staff   Tobacco  Allergies  Meds              Reviewed and updated as needed this visit by Provider                 Past Medical History:   Diagnosis Date    Arthritis 2006    Mononucleosis 12/11/2016    Parotitis 12/11/2016      Past Surgical History:   Procedure Laterality Date    LAP VENTRAL HERNIA REPAIR  04/19/2018       Review of Systems   Constitutional:  Negative for chills and fever.   HENT:  Negative for congestion, ear pain, hearing loss and sore throat.    Eyes:  Negative for pain and visual disturbance.   Respiratory:  Negative for cough and shortness of breath.  " "  Cardiovascular:  Negative for chest pain, palpitations and peripheral edema.   Gastrointestinal:  Negative for abdominal pain, constipation, diarrhea, heartburn, hematochezia and nausea.   Genitourinary:  Negative for dysuria, frequency, genital sores, hematuria, impotence, penile discharge and urgency.   Musculoskeletal:  Negative for arthralgias, joint swelling and myalgias.   Skin:  Negative for rash.   Neurological:  Negative for dizziness, weakness, headaches and paresthesias.   Psychiatric/Behavioral:  Negative for mood changes and sleep disturbance. The patient is nervous/anxious.          OBJECTIVE:   /75 (BP Location: Right arm, Patient Position: Sitting, Cuff Size: Adult Large)   Pulse 60   Temp (!) 96.2  F (35.7  C) (Tympanic)   Resp 20   Ht 1.81 m (5' 11.25\")   Wt 99.8 kg (220 lb)   BMI 30.47 kg/m      Physical Exam  Healthy-appearing young man in no distress  Eyes appear normal  HEENT exam unremarkable.  Mallampati 4  Neck supple no thyromegaly  Lungs clear  Cardiac exam regular no murmur or edema.  Normal carotid and posterior tibial pulsations  Abdomen soft nontender no hepatosplenomegaly  No calf tenderness or edema on either side  Alert, oriented, speech fluent, memory good, cranial nerves normal, strength and gait normal  Normal mood and affect    Diagnostic Test Results:  Labs reviewed in Epic    ASSESSMENT/PLAN:     Problem List Items Addressed This Visit       Ankylosing spondylitis (H)     Doing well on etanercept         Obstructive sleep apnea syndrome     HST 2/22/18 AHI 9.3 with oximetry nadit 74%  Snores minimally and has no daytime sleepiness with 15 to 20 pound weight         Class 1 obesity due to excess calories with serious comorbidity and body mass index (BMI) of 30.0 to 30.9 in adult     15 to 20 pound weight loss         Generalized anxiety disorder     Doing well on bupropion         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 (H)     Recurrent " "left leg 2020 and 2021  Both episodes unprovoked  Heterozygous factor V Leiden and activated protein C resistance  Had Hem consult  Stopped Xarelto due to cost         Factor 5 Leiden mutation, heterozygous (H24)    Relevant Orders    Adult Oncology/Hematology  Referral    Activated protein C resistance (H24)    Relevant Orders    Adult Oncology/Hematology  Referral    Dyslipidemia    Relevant Orders    Lipid panel reflex to direct LDL Fasting     Other Visit Diagnoses       Wellness examination    -  Primary    Screening for HIV (human immunodeficiency virus)        Declined after discussion    Need for hepatitis C screening test        Declined after discussion                  COUNSELING:   Reviewed preventive health counseling, as reflected in patient instructions  Special attention given to:        Regular exercise       Healthy diet/nutrition       Immunizations  Vaccinated for: Hepatitis B           Consider Hep C screening for all patients one time for ages 18-79 years       HIV screeninx in teen years, 1x in adult years, and at intervals if high risk       Colorectal cancer screening       Prostate cancer screening      BMI:   Estimated body mass index is 30.47 kg/m  as calculated from the following:    Height as of this encounter: 1.81 m (5' 11.25\").    Weight as of this encounter: 99.8 kg (220 lb).   Weight management plan: Discussed healthy diet and exercise guidelines      He reports that he has never smoked. He has never been exposed to tobacco smoke. He has never used smokeless tobacco.            Jordy Carver MD  [facility]  Answers submitted by the patient for this visit:  Patient Health Questionnaire (Submitted on 2024)  If you checked off any problems, how difficult have these problems made it for you to do your work, take care of things at home, or get along with other people?: Not difficult at all  PHQ9 TOTAL SCORE: 1  ANTONI-7 " (Submitted on 1/8/2024)  ANTONI 7 TOTAL SCORE: 3

## 2024-01-08 NOTE — ASSESSMENT & PLAN NOTE
Recurrent left leg 2/2020 and 1/2021  Both episodes unprovoked  Heterozygous factor V Leiden and activated protein C resistance  Had Hem consult  Stopped Xarelto due to cost

## 2024-01-10 ENCOUNTER — TELEPHONE (OUTPATIENT)
Dept: HEMATOLOGY | Facility: CLINIC | Age: 44
End: 2024-01-10
Payer: COMMERCIAL

## 2024-01-15 ENCOUNTER — LAB (OUTPATIENT)
Dept: LAB | Facility: CLINIC | Age: 44
End: 2024-01-15
Payer: COMMERCIAL

## 2024-01-15 DIAGNOSIS — E78.5 DYSLIPIDEMIA: ICD-10-CM

## 2024-01-15 DIAGNOSIS — Z11.59 NEED FOR HEPATITIS C SCREENING TEST: ICD-10-CM

## 2024-01-15 DIAGNOSIS — Z11.4 SCREENING FOR HIV (HUMAN IMMUNODEFICIENCY VIRUS): Primary | ICD-10-CM

## 2024-01-15 LAB
CHOLEST SERPL-MCNC: 207 MG/DL
FASTING STATUS PATIENT QL REPORTED: YES
HDLC SERPL-MCNC: 43 MG/DL
LDLC SERPL CALC-MCNC: 122 MG/DL
NONHDLC SERPL-MCNC: 164 MG/DL
TRIGL SERPL-MCNC: 209 MG/DL

## 2024-01-15 PROCEDURE — 80061 LIPID PANEL: CPT

## 2024-01-15 PROCEDURE — 36415 COLL VENOUS BLD VENIPUNCTURE: CPT

## 2024-03-04 ENCOUNTER — OFFICE VISIT (OUTPATIENT)
Dept: FAMILY MEDICINE | Facility: CLINIC | Age: 44
End: 2024-03-04
Payer: COMMERCIAL

## 2024-03-04 VITALS
OXYGEN SATURATION: 97 % | RESPIRATION RATE: 18 BRPM | HEART RATE: 87 BPM | HEIGHT: 71 IN | DIASTOLIC BLOOD PRESSURE: 82 MMHG | TEMPERATURE: 96.7 F | SYSTOLIC BLOOD PRESSURE: 118 MMHG | BODY MASS INDEX: 30.91 KG/M2 | WEIGHT: 220.8 LBS

## 2024-03-04 DIAGNOSIS — R19.4 FREQUENT BOWEL MOVEMENTS: ICD-10-CM

## 2024-03-04 DIAGNOSIS — R19.8 RECTAL DISCHARGE: Primary | ICD-10-CM

## 2024-03-04 DIAGNOSIS — R19.5 OCCULT BLOOD POSITIVE STOOL: ICD-10-CM

## 2024-03-04 LAB — HEMOCCULT STL QL IA: POSITIVE

## 2024-03-04 PROCEDURE — 82274 ASSAY TEST FOR BLOOD FECAL: CPT | Performed by: NURSE PRACTITIONER

## 2024-03-04 PROCEDURE — 99214 OFFICE O/P EST MOD 30 MIN: CPT | Performed by: NURSE PRACTITIONER

## 2024-03-04 NOTE — PATIENT INSTRUCTIONS
*Recommend barrier cream such as Desitin as often as needed.  Using gentle baby wipes can be helpful.  Recommend a trial of avoiding lactose (dairy).

## 2024-03-04 NOTE — PROGRESS NOTES
Assessment & Plan     Rectal discharge  Rectal discharge without obvious bleeding X 5 weeks.  Differentials under consideration include fecal incontinence/mucus discharge secondary to colitis, prolapse, hemorrhoid.  Perianal fistula also on differential but clinical exam shows no sign of abscess.  Recommend Desitin or similar barrier cream.  Recommend he try decreasing or avoiding lactose.  He has history of frequent bowel movements, about 4 daily on average, and looser stool.  Could try increasing fiber in the diet as well.  He does not drink any caffeine.  Recommend using a premoistened pad or baby wipe.    Fit test positive for blood in stool.  Fecal calprotectin negative.  Recommend diagnostic colonoscopy and referral to GI for consult.  Does have history of ankylosing spondylitis and on Enbrel, autoimmune cause is on the differential.  - Fecal colorectal cancer screen (FIT); Future  - Calprotectin Feces; Future  - Fecal colorectal cancer screen (FIT)  - Calprotectin Feces  - Adult GI  Referral - Consult Only; Future  - Adult GI  Referral - Procedure Only; Future    Frequent bowel movements  - Calprotectin Feces; Future  - Calprotectin Feces  - Adult GI  Referral - Consult Only; Future  - Adult GI  Referral - Procedure Only; Future    Occult blood positive stool  - Adult GI  Referral - Consult Only; Future  - Adult GI  Referral - Procedure Only; Future                  Subjective   Dimitrios is a 43 year old, presenting for the following health issues:  Rectal Problem (Anal leaking on and off x 1 month getting worse after having a bowel movement it seems to continue to leak out afterwards continuously throughout the day, pt. Remembers as a college student he had fissures)        3/4/2024     3:25 PM   Additional Questions   Roomed by RADHA Penn     Dimitrios is a pleasant 43-year-old male with ankylosing spondylitis on Enbrel and JELANI who presents today for rectal  "leakage for 5 weeks and worsening.  Typically occurs after bowel movement, about an hour later has discharge and feels \"like I never wiped\".  Reports brown runny discharge.  No pain but reports discomfort and itching.  Has history of anal fissure in the past.  Denies any history of hemorrhoids or observed external hemorrhoid.  No lump or tenderness of the rectum.  He denies any fever, chills, nausea, vomiting or abdominal pain.  He is drinking and eating okay.  No history of colonoscopy.  He has no family history of colon cancer.  Reports he typically has 2-3 loose stools daily.  He noticed mild amount of bright red blood on top of the stool 2 to 3 weeks.                  History of Present Illness       Reason for visit:  Anal leakage  Symptom onset:  3-4 weeks ago  Symptoms include:  Anus  Symptom intensity:  Severe  Symptom progression:  Worsening  Had these symptoms before:  Yes  Has tried/received treatment for these symptoms:  Yes  Previous treatment was successful:  Yes  Prior treatment description:  I dont recall  What makes it worse:  Movement  What makes it better:  No    He eats 2-3 servings of fruits and vegetables daily.He consumes 0 sweetened beverage(s) daily.He exercises with enough effort to increase his heart rate 10 to 19 minutes per day.  He exercises with enough effort to increase his heart rate 4 days per week.   He is taking medications regularly.                 Review of Systems   Constitutional: Negative.  Negative for chills, fatigue and fever.   Respiratory: Negative.     Cardiovascular: Negative.    Gastrointestinal:  Positive for rectal pain (Positive for rectal discomfort, itching). Negative for abdominal distention, abdominal pain, anal bleeding, blood in stool, constipation, diarrhea, nausea and vomiting.        Pos for rectal discharge, brown and watery, stool incontinence   Genitourinary: Negative.    Skin:  Positive for rash.   Neurological:  Negative for dizziness and " "light-headedness.           Objective    /82 (BP Location: Right arm, Patient Position: Sitting, Cuff Size: Adult Large)   Pulse 87   Temp (!) 96.7  F (35.9  C) (Tympanic)   Resp 18   Ht 1.81 m (5' 11.25\")   Wt 100.2 kg (220 lb 12.8 oz)   SpO2 97%   BMI 30.58 kg/m    Body mass index is 30.58 kg/m .  Physical Exam   GENERAL: alert and no distress  EYES: Eyes grossly normal to inspection, PERRL and conjunctivae and sclerae normal  HENT: ear canals and TM's normal, nose and mouth without ulcers or lesions  NECK: no adenopathy, no asymmetry, masses, or scars  RESP: lungs clear to auscultation - no rales, rhonchi or wheezes  CV: regular rate and rhythm, normal S1 S2, no S3 or S4, no murmur, click or rub, no peripheral edema  ABDOMEN: soft, nontender, no hepatosplenomegaly, no masses and bowel sounds normal  RECTAL: normal sphincter tone, no rectal masses, prostate of normal size for age, smooth, nontender without masses/nodules, and without signs of rectal abscess, no obvious purulent drainage  MS: no gross musculoskeletal defects noted, no edema  SKIN: no suspicious lesions or rashes  NEURO: Normal strength and tone, mentation intact and speech normal  PSYCH: mentation appears normal, affect normal/bright            Signed Electronically by: KAILASH Chavez CNP    "

## 2024-03-05 ENCOUNTER — MYC MEDICAL ADVICE (OUTPATIENT)
Dept: FAMILY MEDICINE | Facility: CLINIC | Age: 44
End: 2024-03-05

## 2024-03-05 ENCOUNTER — APPOINTMENT (OUTPATIENT)
Dept: LAB | Facility: CLINIC | Age: 44
End: 2024-03-05
Payer: COMMERCIAL

## 2024-03-05 PROCEDURE — 83993 ASSAY FOR CALPROTECTIN FECAL: CPT | Performed by: NURSE PRACTITIONER

## 2024-03-05 ASSESSMENT — ENCOUNTER SYMPTOMS
DIARRHEA: 0
NAUSEA: 0
RECTAL PAIN: 1
RESPIRATORY NEGATIVE: 1
BLOOD IN STOOL: 0
CONSTIPATION: 0
VOMITING: 0
CONSTITUTIONAL NEGATIVE: 1
ABDOMINAL DISTENTION: 0
ANAL BLEEDING: 0
ABDOMINAL PAIN: 0
CARDIOVASCULAR NEGATIVE: 1

## 2024-03-06 LAB — CALPROTECTIN STL-MCNT: 14.1 MG/KG (ref 0–49.9)

## 2024-03-07 ASSESSMENT — ENCOUNTER SYMPTOMS
DIZZINESS: 0
CHILLS: 0
LIGHT-HEADEDNESS: 0
FEVER: 0
FATIGUE: 0

## 2024-03-13 ENCOUNTER — TRANSFERRED RECORDS (OUTPATIENT)
Dept: HEALTH INFORMATION MANAGEMENT | Facility: CLINIC | Age: 44
End: 2024-03-13
Payer: COMMERCIAL

## 2024-05-28 ENCOUNTER — MYC MEDICAL ADVICE (OUTPATIENT)
Dept: FAMILY MEDICINE | Facility: CLINIC | Age: 44
End: 2024-05-28
Payer: COMMERCIAL

## 2024-12-09 ENCOUNTER — PATIENT OUTREACH (OUTPATIENT)
Dept: CARE COORDINATION | Facility: CLINIC | Age: 44
End: 2024-12-09
Payer: COMMERCIAL

## 2024-12-23 ENCOUNTER — PATIENT OUTREACH (OUTPATIENT)
Dept: CARE COORDINATION | Facility: CLINIC | Age: 44
End: 2024-12-23
Payer: COMMERCIAL

## 2025-02-16 ENCOUNTER — HEALTH MAINTENANCE LETTER (OUTPATIENT)
Age: 45
End: 2025-02-16